# Patient Record
Sex: FEMALE | Race: BLACK OR AFRICAN AMERICAN | NOT HISPANIC OR LATINO | Employment: UNEMPLOYED | URBAN - METROPOLITAN AREA
[De-identification: names, ages, dates, MRNs, and addresses within clinical notes are randomized per-mention and may not be internally consistent; named-entity substitution may affect disease eponyms.]

---

## 2023-02-24 LAB
ANION GAP IN SER/PLAS: 12 MMOL/L (ref 10–20)
BASOPHILS (10*3/UL) IN BLOOD BY AUTOMATED COUNT: 0.02 X10E9/L (ref 0–0.1)
BASOPHILS/100 LEUKOCYTES IN BLOOD BY AUTOMATED COUNT: 0.3 % (ref 0–2)
CALCIUM (MG/DL) IN SER/PLAS: 9 MG/DL (ref 8.6–10.3)
CARBON DIOXIDE, TOTAL (MMOL/L) IN SER/PLAS: 25 MMOL/L (ref 21–32)
CHLORIDE (MMOL/L) IN SER/PLAS: 107 MMOL/L (ref 98–107)
CREATININE (MG/DL) IN SER/PLAS: 0.54 MG/DL (ref 0.5–1.05)
EOSINOPHILS (10*3/UL) IN BLOOD BY AUTOMATED COUNT: 0.43 X10E9/L (ref 0–0.7)
EOSINOPHILS/100 LEUKOCYTES IN BLOOD BY AUTOMATED COUNT: 6.6 % (ref 0–6)
ERYTHROCYTE DISTRIBUTION WIDTH (RATIO) BY AUTOMATED COUNT: 13.2 % (ref 11.5–14.5)
ERYTHROCYTE MEAN CORPUSCULAR HEMOGLOBIN CONCENTRATION (G/DL) BY AUTOMATED: 32.3 G/DL (ref 32–36)
ERYTHROCYTE MEAN CORPUSCULAR VOLUME (FL) BY AUTOMATED COUNT: 96 FL (ref 80–100)
ERYTHROCYTES (10*6/UL) IN BLOOD BY AUTOMATED COUNT: 3.74 X10E12/L (ref 4–5.2)
GFR FEMALE: >90 ML/MIN/1.73M2
GLUCOSE (MG/DL) IN SER/PLAS: 78 MG/DL (ref 74–99)
HEMATOCRIT (%) IN BLOOD BY AUTOMATED COUNT: 35.9 % (ref 36–46)
HEMOGLOBIN (G/DL) IN BLOOD: 11.6 G/DL (ref 12–16)
IMMATURE GRANULOCYTES/100 LEUKOCYTES IN BLOOD BY AUTOMATED COUNT: 0.3 % (ref 0–0.9)
LEUKOCYTES (10*3/UL) IN BLOOD BY AUTOMATED COUNT: 6.5 X10E9/L (ref 4.4–11.3)
LYMPHOCYTES (10*3/UL) IN BLOOD BY AUTOMATED COUNT: 2.11 X10E9/L (ref 1.2–4.8)
LYMPHOCYTES/100 LEUKOCYTES IN BLOOD BY AUTOMATED COUNT: 32.3 % (ref 13–44)
MONOCYTES (10*3/UL) IN BLOOD BY AUTOMATED COUNT: 0.68 X10E9/L (ref 0.1–1)
MONOCYTES/100 LEUKOCYTES IN BLOOD BY AUTOMATED COUNT: 10.4 % (ref 2–10)
NEUTROPHILS (10*3/UL) IN BLOOD BY AUTOMATED COUNT: 3.28 X10E9/L (ref 1.2–7.7)
NEUTROPHILS/100 LEUKOCYTES IN BLOOD BY AUTOMATED COUNT: 50.1 % (ref 40–80)
PLATELETS (10*3/UL) IN BLOOD AUTOMATED COUNT: 327 X10E9/L (ref 150–450)
POTASSIUM (MMOL/L) IN SER/PLAS: 3.6 MMOL/L (ref 3.5–5.3)
SODIUM (MMOL/L) IN SER/PLAS: 140 MMOL/L (ref 136–145)
UREA NITROGEN (MG/DL) IN SER/PLAS: 8 MG/DL (ref 6–23)

## 2023-04-28 LAB
ANION GAP IN SER/PLAS: 12 MMOL/L (ref 10–20)
BASOPHILS (10*3/UL) IN BLOOD BY AUTOMATED COUNT: 0.03 X10E9/L (ref 0–0.1)
BASOPHILS/100 LEUKOCYTES IN BLOOD BY AUTOMATED COUNT: 0.5 % (ref 0–2)
CALCIUM (MG/DL) IN SER/PLAS: 9.9 MG/DL (ref 8.6–10.3)
CARBON DIOXIDE, TOTAL (MMOL/L) IN SER/PLAS: 26 MMOL/L (ref 21–32)
CHLORIDE (MMOL/L) IN SER/PLAS: 103 MMOL/L (ref 98–107)
CREATININE (MG/DL) IN SER/PLAS: 0.81 MG/DL (ref 0.5–1.05)
EOSINOPHILS (10*3/UL) IN BLOOD BY AUTOMATED COUNT: 0.12 X10E9/L (ref 0–0.7)
EOSINOPHILS/100 LEUKOCYTES IN BLOOD BY AUTOMATED COUNT: 2 % (ref 0–6)
ERYTHROCYTE DISTRIBUTION WIDTH (RATIO) BY AUTOMATED COUNT: 14.1 % (ref 11.5–14.5)
ERYTHROCYTE MEAN CORPUSCULAR HEMOGLOBIN CONCENTRATION (G/DL) BY AUTOMATED: 31.1 G/DL (ref 32–36)
ERYTHROCYTE MEAN CORPUSCULAR VOLUME (FL) BY AUTOMATED COUNT: 95 FL (ref 80–100)
ERYTHROCYTES (10*6/UL) IN BLOOD BY AUTOMATED COUNT: 4.08 X10E12/L (ref 4–5.2)
GFR FEMALE: 84 ML/MIN/1.73M2
GLUCOSE (MG/DL) IN SER/PLAS: 82 MG/DL (ref 74–99)
HEMATOCRIT (%) IN BLOOD BY AUTOMATED COUNT: 38.9 % (ref 36–46)
HEMOGLOBIN (G/DL) IN BLOOD: 12.1 G/DL (ref 12–16)
IMMATURE GRANULOCYTES/100 LEUKOCYTES IN BLOOD BY AUTOMATED COUNT: 0.3 % (ref 0–0.9)
LEUKOCYTES (10*3/UL) IN BLOOD BY AUTOMATED COUNT: 6 X10E9/L (ref 4.4–11.3)
LYMPHOCYTES (10*3/UL) IN BLOOD BY AUTOMATED COUNT: 2.1 X10E9/L (ref 1.2–4.8)
LYMPHOCYTES/100 LEUKOCYTES IN BLOOD BY AUTOMATED COUNT: 35.2 % (ref 13–44)
MONOCYTES (10*3/UL) IN BLOOD BY AUTOMATED COUNT: 0.6 X10E9/L (ref 0.1–1)
MONOCYTES/100 LEUKOCYTES IN BLOOD BY AUTOMATED COUNT: 10.1 % (ref 2–10)
NEUTROPHILS (10*3/UL) IN BLOOD BY AUTOMATED COUNT: 3.09 X10E9/L (ref 1.2–7.7)
NEUTROPHILS/100 LEUKOCYTES IN BLOOD BY AUTOMATED COUNT: 51.9 % (ref 40–80)
PLATELETS (10*3/UL) IN BLOOD AUTOMATED COUNT: 310 X10E9/L (ref 150–450)
POTASSIUM (MMOL/L) IN SER/PLAS: 3.8 MMOL/L (ref 3.5–5.3)
SODIUM (MMOL/L) IN SER/PLAS: 137 MMOL/L (ref 136–145)
UREA NITROGEN (MG/DL) IN SER/PLAS: 13 MG/DL (ref 6–23)

## 2023-05-10 ENCOUNTER — HOSPITAL ENCOUNTER (OUTPATIENT)
Dept: DATA CONVERSION | Facility: HOSPITAL | Age: 58
End: 2023-05-10
Attending: OTOLARYNGOLOGY | Admitting: OTOLARYNGOLOGY
Payer: COMMERCIAL

## 2023-05-10 DIAGNOSIS — K12.2 CELLULITIS AND ABSCESS OF MOUTH: ICD-10-CM

## 2023-05-10 DIAGNOSIS — I10 ESSENTIAL (PRIMARY) HYPERTENSION: ICD-10-CM

## 2023-05-10 DIAGNOSIS — F17.200 NICOTINE DEPENDENCE, UNSPECIFIED, UNCOMPLICATED: ICD-10-CM

## 2023-05-10 DIAGNOSIS — K11.5 SIALOLITHIASIS: ICD-10-CM

## 2023-05-10 DIAGNOSIS — E78.5 HYPERLIPIDEMIA, UNSPECIFIED: ICD-10-CM

## 2023-05-30 LAB
COMPLETE PATHOLOGY REPORT: NORMAL
CONVERTED CLINICAL DIAGNOSIS-HISTORY: NORMAL
CONVERTED FINAL DIAGNOSIS: NORMAL
CONVERTED FINAL REPORT PDF LINK TO COPY AND PASTE: NORMAL
CONVERTED GROSS DESCRIPTION: NORMAL

## 2023-06-13 LAB
ALANINE AMINOTRANSFERASE (SGPT) (U/L) IN SER/PLAS: 8 U/L (ref 7–45)
ALBUMIN (G/DL) IN SER/PLAS: 4.2 G/DL (ref 3.4–5)
ALKALINE PHOSPHATASE (U/L) IN SER/PLAS: 56 U/L (ref 33–110)
ANION GAP IN SER/PLAS: 13 MMOL/L (ref 10–20)
APPEARANCE, URINE: NORMAL
ASPARTATE AMINOTRANSFERASE (SGOT) (U/L) IN SER/PLAS: 15 U/L (ref 9–39)
BASOPHILS (10*3/UL) IN BLOOD BY AUTOMATED COUNT: 0.03 X10E9/L (ref 0–0.1)
BASOPHILS/100 LEUKOCYTES IN BLOOD BY AUTOMATED COUNT: 0.4 % (ref 0–2)
BILIRUBIN TOTAL (MG/DL) IN SER/PLAS: 0.3 MG/DL (ref 0–1.2)
BILIRUBIN, URINE: NEGATIVE
BLOOD, URINE: NEGATIVE
C REACTIVE PROTEIN (MG/L) IN SER/PLAS: 0.42 MG/DL
CALCIUM (MG/DL) IN SER/PLAS: 10.3 MG/DL (ref 8.6–10.6)
CARBON DIOXIDE, TOTAL (MMOL/L) IN SER/PLAS: 27 MMOL/L (ref 21–32)
CHLORIDE (MMOL/L) IN SER/PLAS: 106 MMOL/L (ref 98–107)
CITRULLINE ANTIBODY, IGG: <1 U/ML
COLOR, URINE: YELLOW
COMPLEMENT C3 (MG/DL) IN SER/PLAS: 149 MG/DL (ref 87–200)
COMPLEMENT C4 (MG/DL) IN SER/PLAS: 42 MG/DL (ref 10–50)
CREATINE KINASE (U/L) IN SER/PLAS: 50 U/L (ref 0–215)
CREATININE (MG/DL) IN SER/PLAS: 0.72 MG/DL (ref 0.5–1.05)
CREATININE (MG/DL) IN URINE: 120 MG/DL (ref 20–320)
EOSINOPHILS (10*3/UL) IN BLOOD BY AUTOMATED COUNT: 0.15 X10E9/L (ref 0–0.7)
EOSINOPHILS/100 LEUKOCYTES IN BLOOD BY AUTOMATED COUNT: 2.2 % (ref 0–6)
ERYTHROCYTE DISTRIBUTION WIDTH (RATIO) BY AUTOMATED COUNT: 14.2 % (ref 11.5–14.5)
ERYTHROCYTE MEAN CORPUSCULAR HEMOGLOBIN CONCENTRATION (G/DL) BY AUTOMATED: 33 G/DL (ref 32–36)
ERYTHROCYTE MEAN CORPUSCULAR VOLUME (FL) BY AUTOMATED COUNT: 96 FL (ref 80–100)
ERYTHROCYTES (10*6/UL) IN BLOOD BY AUTOMATED COUNT: 3.99 X10E12/L (ref 4–5.2)
GFR FEMALE: >90 ML/MIN/1.73M2
GLUCOSE (MG/DL) IN SER/PLAS: 90 MG/DL (ref 74–99)
GLUCOSE, URINE: NEGATIVE MG/DL
HEMATOCRIT (%) IN BLOOD BY AUTOMATED COUNT: 38.2 % (ref 36–46)
HEMOGLOBIN (G/DL) IN BLOOD: 12.6 G/DL (ref 12–16)
IMMATURE GRANULOCYTES/100 LEUKOCYTES IN BLOOD BY AUTOMATED COUNT: 0.4 % (ref 0–0.9)
KETONES, URINE: NEGATIVE MG/DL
LEUKOCYTE ESTERASE, URINE: NEGATIVE
LEUKOCYTES (10*3/UL) IN BLOOD BY AUTOMATED COUNT: 6.7 X10E9/L (ref 4.4–11.3)
LYMPHOCYTES (10*3/UL) IN BLOOD BY AUTOMATED COUNT: 2.03 X10E9/L (ref 1.2–4.8)
LYMPHOCYTES/100 LEUKOCYTES IN BLOOD BY AUTOMATED COUNT: 30.2 % (ref 13–44)
MONOCYTES (10*3/UL) IN BLOOD BY AUTOMATED COUNT: 0.52 X10E9/L (ref 0.1–1)
MONOCYTES/100 LEUKOCYTES IN BLOOD BY AUTOMATED COUNT: 7.7 % (ref 2–10)
NEUTROPHILS (10*3/UL) IN BLOOD BY AUTOMATED COUNT: 3.96 X10E9/L (ref 1.2–7.7)
NEUTROPHILS/100 LEUKOCYTES IN BLOOD BY AUTOMATED COUNT: 59.1 % (ref 40–80)
NITRITE, URINE: NEGATIVE
NRBC (PER 100 WBCS) BY AUTOMATED COUNT: 0 /100 WBC (ref 0–0)
PH, URINE: 7 (ref 5–8)
PLATELETS (10*3/UL) IN BLOOD AUTOMATED COUNT: 355 X10E9/L (ref 150–450)
POTASSIUM (MMOL/L) IN SER/PLAS: 3.6 MMOL/L (ref 3.5–5.3)
PROTEIN (MG/DL) IN URINE: 15 MG/DL (ref 5–24)
PROTEIN TOTAL: 8 G/DL (ref 6.4–8.2)
PROTEIN, URINE: NEGATIVE MG/DL
PROTEIN/CREATININE (MG/MG) IN URINE: 0.13 MG/MG CREAT (ref 0–0.17)
RBC, URINE: NORMAL /HPF (ref 0–5)
RHEUMATOID FACTOR (IU/ML) IN SERUM OR PLASMA: <10 IU/ML (ref 0–15)
SEDIMENTATION RATE, ERYTHROCYTE: 51 MM/H (ref 0–30)
SODIUM (MMOL/L) IN SER/PLAS: 142 MMOL/L (ref 136–145)
SPECIFIC GRAVITY, URINE: 1.01 (ref 1–1.03)
THYROPEROXIDASE AB (IU/ML) IN SER/PLAS: <28 IU/ML
THYROTROPIN (MIU/L) IN SER/PLAS BY DETECTION LIMIT <= 0.05 MIU/L: 1.23 MIU/L (ref 0.44–3.98)
UREA NITROGEN (MG/DL) IN SER/PLAS: 12 MG/DL (ref 6–23)
UROBILINOGEN, URINE: <2 MG/DL (ref 0–1.9)
WBC, URINE: NORMAL /HPF (ref 0–5)

## 2023-06-14 LAB
ANA PATTERN: ABNORMAL
ANA TITER: ABNORMAL
ANTI-CENTROMERE: <0.2 AI
ANTI-CHROMATIN: 2.5 AI
ANTI-DNA (DS): 5 IU/ML
ANTI-JO-1 IGG: <0.2 AI
ANTI-NUCLEAR ANTIBODY (ANA): POSITIVE
ANTI-RIBOSOMAL P: <0.2 AI
ANTI-RNP: >8 AI
ANTI-SCL-70: <0.2 AI
ANTI-SM/RNP: >8 AI
ANTI-SM: <0.2 AI
ANTI-SSA: 0.3 AI
ANTI-SSB: <0.2 AI

## 2023-06-15 LAB
ALBUMIN ELP: 4 G/DL (ref 3.4–5)
ALPHA 1: 0.3 G/DL (ref 0.2–0.6)
ALPHA 2: 1 G/DL (ref 0.4–1.1)
BETA: 1.1 G/DL (ref 0.5–1.2)
GAMMA GLOBULIN: 1.5 G/DL (ref 0.5–1.4)
PATH REVIEW-SERUM PROTEIN ELECTROPHORESIS: NORMAL
PROTEIN ELECTROPHORESIS INTERPRETATION: ABNORMAL
PROTEIN TOTAL: 8 G/DL (ref 6.4–8.2)

## 2023-06-16 LAB — THYROGLOBULIN AB (IU/ML) IN SER/PLAS: <0.9 IU/ML (ref 0–4)

## 2023-09-07 VITALS
HEART RATE: 80 BPM | DIASTOLIC BLOOD PRESSURE: 81 MMHG | TEMPERATURE: 97.3 F | BODY MASS INDEX: 26.69 KG/M2 | RESPIRATION RATE: 17 BRPM | SYSTOLIC BLOOD PRESSURE: 141 MMHG | WEIGHT: 156.31 LBS | HEIGHT: 64 IN

## 2023-09-14 NOTE — H&P
History of Present Illness:   Pregnant/Lactating:  ·  Are You Pregnant no   ·  Are You Currently Breastfeeding no     History Present Illness:  Reason for surgery: Sialolithiasis of the submandibular  duct and gland   HPI:    HPI: No significant changes to the medical history since last H/P    PMH: reviewed in chart   Fam Hx: negative for bleeding disorders  Social Hx: Reviewed in EMR  Allergies: NKDA  ROS: negative except as above in HPI  Physical Exam:  Gen- NAD  Resp- nonlabored on RA, symmetric chest rise  Head/Face- NCAT, no masses or lesions  Eyes- EOMI, clear sclera  Ears- normal external ears, no gross lesions of EACs  Nose- anterior nares clear, no bleeding or drainage  Mouth- lips without lesions, no excessive drooling  Neuro- alert and interactive    Medications, imaging and pertinent labs reviewed in EMR    A/P  Proceed with planned surgery     Allergies:        Allergies:  ·  penicillin : Other, Rash  ·  Latex : Rash, Itching    Home Medication Review:   Home Medications Reviewed: yes     Impression/Procedure:   ·  Impression and Planned Procedure: Proceed with excision of submandibular gland       ERAS (Enhanced Recovery After Surgery):  ·  ERAS Patient: no       Vital Signs:  Temperature C: 36.3 degrees C   Temperature F: 97.3 degrees F   Heart Rate: 80 beats per minute   Respiratory Rate: 17 breath per minute   Blood Pressure Systolic: 141 mm/Hg   Blood Pressure Diastolic: 81 mm/Hg     Physical Exam by System:    Respiratory/Thorax: Breathing comfortably, no stridor.   Cardiovascular: No clubbing/cyanosis/edema in hands.     Consent:   COVID-19 Consent:  ·  COVID-19 Risk Consent Surgeon has reviewed key risks related to the risk of jacob COVID-19 and if they contract COVID-19 what the risks are.     Attestation:   Note Completion:  I am a:  Resident/Fellow   Attending Attestation I saw and evaluated the patient.  I personally obtained the key and critical portions of the history and  physical exam or was physically present for key and  critical portions performed by the resident/fellow. I reviewed the resident/fellow?s documentation and discussed the patient with the resident/fellow.  I agree with the resident/fellow?s medical decision making as documented in the note.     I personally evaluated the patient on 10-May-2023         Electronic Signatures:  Rashid Alvarado)  (Signed 10-May-2023 07:38)   Authored: Note Completion   Co-Signer: History of Present Illness, Allergies, Home Medication Review, Impression/Procedure, ERAS, Physical Exam, Consent, Note Completion  Anne Rodriguez (Resident))  (Signed 10-May-2023 07:17)   Authored: History of Present Illness, Allergies, Home  Medication Review, Impression/Procedure, ERAS, Physical Exam, Consent, Note Completion      Last Updated: 10-May-2023 07:38 by Rashid Alvarado)

## 2023-09-20 PROBLEM — I10 ESSENTIAL HYPERTENSION: Status: ACTIVE | Noted: 2023-09-20

## 2023-09-20 PROBLEM — J39.0 PARAPHARYNGEAL ABSCESS: Status: ACTIVE | Noted: 2023-09-20

## 2023-09-20 PROBLEM — E87.6 HYPOKALEMIA: Status: ACTIVE | Noted: 2023-09-20

## 2023-09-20 PROBLEM — H52.4 PRESBYOPIA OF BOTH EYES: Status: ACTIVE | Noted: 2023-09-20

## 2023-09-20 PROBLEM — H02.821 CYST OF RIGHT UPPER EYELID: Status: ACTIVE | Noted: 2023-09-20

## 2023-09-20 PROBLEM — R53.81 MALAISE: Status: ACTIVE | Noted: 2023-09-20

## 2023-09-20 PROBLEM — K11.5 SALIVARY DUCT STONE: Status: ACTIVE | Noted: 2023-09-20

## 2023-09-20 PROBLEM — H02.9 EYELID LESION: Status: ACTIVE | Noted: 2023-09-20

## 2023-09-20 PROBLEM — D25.9 UTERINE LEIOMYOMA: Status: ACTIVE | Noted: 2023-09-20

## 2023-09-20 PROBLEM — M79.673 HEEL PAIN: Status: ACTIVE | Noted: 2023-09-20

## 2023-09-20 PROBLEM — R00.0 TACHYCARDIA: Status: ACTIVE | Noted: 2023-09-20

## 2023-09-20 PROBLEM — R79.89 D-DIMER, ELEVATED: Status: ACTIVE | Noted: 2023-09-20

## 2023-09-20 PROBLEM — M35.9 UNDIFFERENTIATED CONNECTIVE TISSUE DISEASE (MULTI): Status: ACTIVE | Noted: 2023-09-20

## 2023-09-20 PROBLEM — L65.9 HAIR LOSS: Status: ACTIVE | Noted: 2023-09-20

## 2023-09-20 PROBLEM — J39.1 PHARYNGEAL ABSCESS: Status: ACTIVE | Noted: 2023-09-20

## 2023-09-20 PROBLEM — N76.0 ACUTE VAGINITIS: Status: ACTIVE | Noted: 2023-09-20

## 2023-09-20 PROBLEM — K11.20 SIALOADENITIS OF SUBMANDIBULAR GLAND: Status: ACTIVE | Noted: 2023-09-20

## 2023-09-20 PROBLEM — A41.9 SEPSIS (MULTI): Status: ACTIVE | Noted: 2023-09-20

## 2023-09-20 PROBLEM — T46.6X5A MYALGIA DUE TO STATIN: Status: ACTIVE | Noted: 2023-09-20

## 2023-09-20 PROBLEM — L40.9 PSORIASIS: Status: ACTIVE | Noted: 2023-09-20

## 2023-09-20 PROBLEM — H02.825 CYST OF LEFT LOWER EYELID: Status: ACTIVE | Noted: 2023-09-20

## 2023-09-20 PROBLEM — K13.70 LESION OF MOUTH: Status: ACTIVE | Noted: 2023-09-20

## 2023-09-20 PROBLEM — M25.511 RIGHT SHOULDER PAIN: Status: ACTIVE | Noted: 2023-09-20

## 2023-09-20 PROBLEM — L66.9 CICATRICIAL ALOPECIA, UNSPECIFIED: Status: ACTIVE | Noted: 2021-09-14

## 2023-09-20 PROBLEM — K12.2 SUBMANDIBULAR ABSCESS: Status: ACTIVE | Noted: 2023-09-20

## 2023-09-20 PROBLEM — R53.83 FATIGUE: Status: ACTIVE | Noted: 2023-09-20

## 2023-09-20 PROBLEM — R59.0 CERVICAL LYMPHADENOPATHY: Status: ACTIVE | Noted: 2023-09-20

## 2023-09-20 PROBLEM — G89.29 BACK PAIN, CHRONIC: Status: ACTIVE | Noted: 2023-09-20

## 2023-09-20 PROBLEM — K64.8 INTERNAL HEMORRHOIDS: Status: ACTIVE | Noted: 2023-09-20

## 2023-09-20 PROBLEM — H91.90 HEARING DIFFICULTY: Status: ACTIVE | Noted: 2023-09-20

## 2023-09-20 PROBLEM — M54.2 NECK PAIN: Status: ACTIVE | Noted: 2023-09-20

## 2023-09-20 PROBLEM — D10.39 PAPILLOMA OF UVULA: Status: ACTIVE | Noted: 2023-09-20

## 2023-09-20 PROBLEM — N90.89 VULVAR LESION: Status: ACTIVE | Noted: 2023-09-20

## 2023-09-20 PROBLEM — M15.9 OSTEOARTHRITIS, MULTIPLE SITES: Status: ACTIVE | Noted: 2023-09-20

## 2023-09-20 PROBLEM — J06.9 ACUTE UPPER RESPIRATORY INFECTION: Status: ACTIVE | Noted: 2023-09-20

## 2023-09-20 PROBLEM — M72.2 PLANTAR FASCIITIS: Status: ACTIVE | Noted: 2023-09-20

## 2023-09-20 PROBLEM — K21.9 GERD (GASTROESOPHAGEAL REFLUX DISEASE): Status: ACTIVE | Noted: 2023-09-20

## 2023-09-20 PROBLEM — J02.9 SORE THROAT: Status: ACTIVE | Noted: 2023-09-20

## 2023-09-20 PROBLEM — N93.8 UTERINE BLEEDING, DYSFUNCTIONAL: Status: ACTIVE | Noted: 2023-09-20

## 2023-09-20 PROBLEM — L70.9 ACNE: Status: ACTIVE | Noted: 2021-09-14

## 2023-09-20 PROBLEM — K14.6 TONGUE BURNING SENSATION: Status: ACTIVE | Noted: 2023-09-20

## 2023-09-20 PROBLEM — B07.9 CUTANEOUS WART: Status: ACTIVE | Noted: 2023-09-20

## 2023-09-20 PROBLEM — G89.18 ACUTE POST-OPERATIVE PAIN: Status: ACTIVE | Noted: 2023-09-20

## 2023-09-20 PROBLEM — R21 RASH: Status: ACTIVE | Noted: 2023-09-20

## 2023-09-20 PROBLEM — E78.5 HYPERLIPIDEMIA: Status: ACTIVE | Noted: 2023-09-20

## 2023-09-20 PROBLEM — N92.0 MENORRHAGIA: Status: ACTIVE | Noted: 2023-09-20

## 2023-09-20 PROBLEM — K11.20 SIALADENITIS: Status: ACTIVE | Noted: 2023-09-20

## 2023-09-20 PROBLEM — B37.31 VAGINAL CANDIDIASIS: Status: ACTIVE | Noted: 2023-09-20

## 2023-09-20 PROBLEM — L93.0 DISCOID LUPUS ERYTHEMATOSUS: Status: ACTIVE | Noted: 2021-09-14

## 2023-09-20 PROBLEM — M54.9 BACK PAIN, CHRONIC: Status: ACTIVE | Noted: 2023-09-20

## 2023-09-20 PROBLEM — H10.9 CONJUNCTIVITIS: Status: ACTIVE | Noted: 2023-09-20

## 2023-09-20 PROBLEM — K12.0 CANKER SORE: Status: ACTIVE | Noted: 2023-09-20

## 2023-09-20 PROBLEM — M79.10 MYALGIA DUE TO STATIN: Status: ACTIVE | Noted: 2023-09-20

## 2023-09-20 RX ORDER — CEFTRIAXONE 2 G/1
INJECTION, POWDER, FOR SOLUTION INTRAMUSCULAR; INTRAVENOUS
COMMUNITY
Start: 2023-01-26 | End: 2023-12-22 | Stop reason: WASHOUT

## 2023-09-20 RX ORDER — TRIAMCINOLONE ACETONIDE 1 MG/G
OINTMENT TOPICAL
COMMUNITY
Start: 2022-05-11 | End: 2023-12-22 | Stop reason: WASHOUT

## 2023-09-20 RX ORDER — ROSUVASTATIN CALCIUM 5 MG/1
1 TABLET, COATED ORAL DAILY
COMMUNITY
Start: 2015-09-10 | End: 2023-11-22 | Stop reason: WASHOUT

## 2023-09-20 RX ORDER — ASCORBIC ACID 250 MG
1 TABLET,CHEWABLE ORAL DAILY
COMMUNITY

## 2023-09-20 RX ORDER — EPINEPHRINE 0.22MG
AEROSOL WITH ADAPTER (ML) INHALATION
COMMUNITY
Start: 2022-03-02

## 2023-09-20 RX ORDER — TRAMADOL HYDROCHLORIDE 50 MG/1
TABLET ORAL
COMMUNITY
Start: 2023-01-17 | End: 2023-12-22 | Stop reason: WASHOUT

## 2023-09-20 RX ORDER — HYDROCORTISONE 25 MG/G
OINTMENT TOPICAL
COMMUNITY
Start: 2019-01-30 | End: 2023-12-22 | Stop reason: WASHOUT

## 2023-09-20 RX ORDER — CLINDAMYCIN HYDROCHLORIDE 300 MG/1
CAPSULE ORAL
COMMUNITY
Start: 2023-01-17 | End: 2023-12-22 | Stop reason: WASHOUT

## 2023-09-20 RX ORDER — PREDNISONE 20 MG/1
40 TABLET ORAL DAILY
COMMUNITY
Start: 2023-01-09 | End: 2023-12-22 | Stop reason: WASHOUT

## 2023-09-20 RX ORDER — CEPHALEXIN 500 MG/1
500 TABLET ORAL 3 TIMES DAILY
COMMUNITY
Start: 2023-02-07 | End: 2023-12-22 | Stop reason: WASHOUT

## 2023-09-20 RX ORDER — EZETIMIBE 10 MG/1
1 TABLET ORAL DAILY
COMMUNITY
Start: 2021-05-21

## 2023-09-20 RX ORDER — HYDROCHLOROTHIAZIDE 25 MG/1
25 TABLET ORAL DAILY
COMMUNITY
End: 2024-03-26 | Stop reason: SDUPTHER

## 2023-09-20 RX ORDER — CYCLOBENZAPRINE HCL 10 MG
TABLET ORAL 3 TIMES DAILY PRN
COMMUNITY
Start: 2019-08-23 | End: 2024-05-28 | Stop reason: WASHOUT

## 2023-09-20 RX ORDER — CALCIUM CARBONATE/VITAMIN D3 600 MG-10
TABLET ORAL
COMMUNITY

## 2023-09-20 RX ORDER — SODIUM FLUORIDE 5 MG/G
GEL, DENTIFRICE DENTAL
COMMUNITY
Start: 2020-12-14 | End: 2024-05-28 | Stop reason: WASHOUT

## 2023-09-20 RX ORDER — FLUCONAZOLE 150 MG
TABLET ORAL
COMMUNITY
Start: 2022-09-14 | End: 2023-11-22 | Stop reason: WASHOUT

## 2023-09-20 RX ORDER — OXYCODONE HYDROCHLORIDE 5 MG/1
5 TABLET ORAL EVERY 6 HOURS
COMMUNITY
Start: 2023-01-17 | End: 2023-01-20

## 2023-09-20 RX ORDER — AMLODIPINE BESYLATE 5 MG/1
1 TABLET ORAL DAILY
COMMUNITY
Start: 2020-10-14 | End: 2023-11-21 | Stop reason: ALTCHOICE

## 2023-09-20 RX ORDER — KETOCONAZOLE 20 MG/G
CREAM TOPICAL
COMMUNITY
Start: 2015-06-04 | End: 2023-12-22 | Stop reason: WASHOUT

## 2023-09-20 RX ORDER — CHOLECALCIFEROL (VITAMIN D3) 25 MCG
1 TABLET ORAL DAILY
COMMUNITY

## 2023-09-20 RX ORDER — HYDROXYCHLOROQUINE SULFATE 200 MG/1
TABLET, FILM COATED ORAL
COMMUNITY
Start: 2016-10-26 | End: 2023-12-22 | Stop reason: WASHOUT

## 2023-09-20 RX ORDER — POTASSIUM CHLORIDE 1500 MG/1
20 TABLET, EXTENDED RELEASE ORAL DAILY
COMMUNITY
Start: 2023-02-17 | End: 2023-12-22 | Stop reason: WASHOUT

## 2023-09-20 RX ORDER — TACROLIMUS 1 MG/G
OINTMENT TOPICAL
COMMUNITY
Start: 2021-05-11 | End: 2023-12-22 | Stop reason: WASHOUT

## 2023-09-20 RX ORDER — ONDANSETRON 4 MG/1
TABLET, ORALLY DISINTEGRATING ORAL
COMMUNITY
Start: 2023-04-06 | End: 2023-11-22 | Stop reason: WASHOUT

## 2023-09-20 RX ORDER — DOXYCYCLINE 100 MG/1
CAPSULE ORAL EVERY 12 HOURS
COMMUNITY
Start: 2022-09-14 | End: 2023-12-22 | Stop reason: WASHOUT

## 2023-09-20 RX ORDER — MELOXICAM 7.5 MG/1
1 TABLET ORAL 2 TIMES DAILY PRN
COMMUNITY
Start: 2022-09-15 | End: 2023-12-22 | Stop reason: WASHOUT

## 2023-10-02 NOTE — OP NOTE
PROCEDURE DETAILS    Preoperative Diagnosis:  Sialolithiasis, K11.5  Cellulitis and abscess of mouth, K12.2    Postoperative Diagnosis:  Sialolithiasis, K11.5  Cellulitis and abscess of mouth, K12.2    Surgeon: Rashid Alvarado  Resident/Fellow/Other Assistant: Luis Eduardo Ramires    Procedure:  1. Left Submandibular Gland Excision  2. Intraoral Salivary Stone Removal  3. Sialodochoplasty    Anesthesia: Declan, Jaquan  Estimated Blood Loss: 75  Findings: 1. Stone of left submandibular duct  Specimens(s) Collected: yes,  Left submandibular gland   Complications: None  Patient Returned To/Condition: PACU/Stable                                 Attestation:   Note Completion:  Attending Attestation I was present for the entire procedure    I am a: Resident/Fellow         Electronic Signatures:  Rashid Alvarado)  (Signed 10-May-2023 13:25)   Authored: Note Completion   Co-Signer: Post-Operative Note, Chart Review, Note Completion  Anne Rodriguez (Resident))  (Signed 10-May-2023 12:12)   Authored: Post-Operative Note, Chart Review, Note Completion      Last Updated: 10-May-2023 13:25 by Rashid Alvarado)

## 2023-10-24 ENCOUNTER — APPOINTMENT (OUTPATIENT)
Dept: RHEUMATOLOGY | Facility: CLINIC | Age: 58
End: 2023-10-24
Payer: COMMERCIAL

## 2023-11-20 ENCOUNTER — TELEPHONE (OUTPATIENT)
Dept: PRIMARY CARE | Facility: CLINIC | Age: 58
End: 2023-11-20
Payer: COMMERCIAL

## 2023-11-21 ENCOUNTER — TELEMEDICINE (OUTPATIENT)
Dept: PRIMARY CARE | Facility: CLINIC | Age: 58
End: 2023-11-21
Payer: COMMERCIAL

## 2023-11-21 DIAGNOSIS — K13.70 LESION OF MOUTH: Primary | ICD-10-CM

## 2023-11-21 PROCEDURE — 99213 OFFICE O/P EST LOW 20 MIN: CPT | Performed by: FAMILY MEDICINE

## 2023-11-21 RX ORDER — AMLODIPINE BESYLATE 5 MG/1
TABLET ORAL
COMMUNITY
Start: 2023-01-18 | End: 2023-12-22 | Stop reason: WASHOUT

## 2023-11-21 RX ORDER — LIDOCAINE HYDROCHLORIDE 20 MG/ML
1.25 SOLUTION OROPHARYNGEAL AS NEEDED
Qty: 100 ML | Refills: 0 | Status: SHIPPED | OUTPATIENT
Start: 2023-11-21 | End: 2023-12-01

## 2023-11-21 NOTE — PROGRESS NOTES
Chief Complaint:   No chief complaint on file.     History Of Present Illness:    Prema Castañeda is a 58 y.o. female presenting with a history of HTN, hyperlipidemia and connective tissue disorder and internal hemorrhoids, Jan 2023- surgery for submandibular/parapharyngeal abscesses, who is presenting for a sore throat.     Salivary glands and face swelling- Jan 2023.   Jan 2023- surgeries for left submandibular and parapharyngeal abscess. sent home with PICC line for antibiotics.   Feb 15, 2023 update: Was having some symptoms with antibiotics and just went back on antibiotics last week. Muscles hurting, potassium levels were off.   March 8, 2023- will have salivary gland stone removal surgery planned.   Nov 2023 update- some taste changes with spicy foods, used numbing mouth wash that is helpful.      Feb 22, 2022- Right sided chest pain- moved to her right shoulder blade. started around 315pm and lasted all throughout the night. Used heating pad and nebulizer. Sleeping upright was more helpful. Now it is a dull pain. Not coughing as much today. Exertion does not effect chest pain.   Some chronic left jaw numbness.   Sept 2022 update: none recently.      Feb 16, 2022- Chest congestion and cough. Since last Wed. Wanted to make sure lungs and heart were ok. Granddaughter came home from school Tue with a cough. Then the next day she started getting sick. Cough: all throughout the day, not horrible. Coughing up phlegm and mucus. Some in chest. Was taking nyquil and vitamins. No concerns for COVID.   Sept 2022 update: good now.   Nov 2023 update: had a cold about a month ago, but she's been coughing since then. Sometimes productive, sometimes dry. We discussed saline rinses.      HTN- she's been taking her hctz. Pt has declined adding medications previously. Her blood pressure has been elevated, and she did not take her medications today. Nov 2023 update: agreed to continue on amlodipine 2.5mg.      Cholesterol-  she's not taking her medication. Some more body aches. Feb 2022 update: not taking crestor now. she is taking zetia. Feb 24, 2022- restarted crestor. Nov 2023 update: off and on with her taking crestor. We agreed she could at least try it every other day.         Rheum-she will not have a rheumatology visit until January 2021. She states that she is having shoulder pain, which was not improved after a January 2020 subacromial bursa injection here in the office. She is also having vague right elbow pain and other aches and pains that may be related to her mixed connective tissue disorder. May 13, 2021 update: was having pain recently and she went to [facility] for rehab. put back on hydroxychloroquine. Feb 2022 update- back on hydroxychloroquine. March 2022- last visit, still on hydroxychloroquine. Feb 2023 update: still taking hydroxychloroquine.         SH: rare cig smoking. stopped etoh. no idu.      Flu shot: declines flu shot     Mammogram: Oct 2020.      COVID: never tested positive. unlikely to take vaccine.    .       Review of Systems:    Negative  Constitutional: fever, chills, night sweats, unexpected weight change, changes in sleep  Eyes: loss of vision, double vision, floaters  Ear/Nose/Throat/Mouth: sore throat, hearing changes, sinus congestion  Cardiovascular: chest pain, chest heaviness, palpitations, swelling in ankles  Psychological: feelings of depression, feelings of anxiety.  Endocrine: excessive thirst, feeling too hot, feeling too cold, feeling tired, fatigue  Hematologic/Lymphatic: swollen glands, blood clotting problems, easy bruising.   Respiratory: shortness of breath, difficulty breathing, wheezing  Neurological: loss of consciousness, tremors, dizzy spells, numbness, tingling.  Gastrointestinal: abdominal pain, nausea, vomiting, indigestion, heartburn, sour taste in throat when waking up, constipation, diarrhea, bloody stools, loss of bowel control  Genitourinary: urinary incontinence,  increased urinary frequency, painful urination, blood in urine  Skin: Rash, lumps or bumps, worrisome moles  Musculoskeletal: bone pain, muscle pain, joint pain  Sexual: sexual health concerns      Positive  Psychological: feeling generally happy, feeling safe at home      Last Recorded Vitals:  There were no vitals filed for this visit.    Past Medical History:  She has a past medical history of Anesthesia of skin, Anxiety disorder, unspecified, Localized enlarged lymph nodes (01/17/2019), Myalgia, unspecified site, Other conditions influencing health status (03/31/2015), Personal history of other diseases of the circulatory system, Personal history of other mental and behavioral disorders, Personal history of other specified conditions, and Pure hypercholesterolemia, unspecified.    Past Surgical History:  She has a past surgical history that includes Tubal ligation (01/10/2014) and Hysterectomy (01/10/2014).      Social History:  She has no history on file for tobacco use, alcohol use, and drug use.    Family History:  No family history on file.     Allergies:  Latex and Penicillins    Outpatient Medications:  Current Outpatient Medications   Medication Instructions    amLODIPine (Norvasc) 5 mg tablet 1 tablet, oral, Daily    ascorbic acid (Vitamin C) 250 MG chewable tablet 1 tablet, oral, Daily    BACILLUS COAGULANS-INULIN ORAL oral    benzoyl peroxide 5 % lotion 1 application    cefTRIAXone (Rocephin) 2 gram     cephalexin (KEFTAB) 500 mg, oral, 3 times daily    cholecalciferol (Vitamin D-3) 25 MCG (1000 UT) tablet 1 tablet, oral, Daily    clindamycin (Cleocin) 300 mg capsule     coenzyme Q-10 100 mg capsule oral    cyclobenzaprine (Flexeril) 10 mg tablet oral, 3 times daily PRN    diclofenac sodium 1 % kit transdermal    Diflucan 150 mg tablet oral, Daily RT    doxycycline (Vibramycin) 100 mg capsule oral, Every 12 hours    ezetimibe (Zetia) 10 mg tablet 1 tablet, oral, Daily    fluoride, sodium, (DentaGeL)  1.1 % gel dental    hydroCHLOROthiazide (HYDRODIURIL) 25 mg, oral, Daily, as directed    hydrocortisone 2.5 % ointment 1 Application    hydroxychloroquine (Plaquenil) 200 mg tablet oral    ketoconazole (NIZOral) 2 % cream 1 application    L. acidophilus/Bifid. animalis 15.5 billion cell capsule oral    meloxicam (Mobic) 7.5 mg tablet 1 tablet, oral, 2 times daily PRN    omega-3 fatty acids-fish oil (One-Per-Day Omega-3) 684-1,200 mg capsule oral    ondansetron ODT (Zofran-ODT) 4 mg disintegrating tablet TAKE 1 TABLET EVERY 6HRS AS NEEDED FOR NAUSEA    potassium chloride CR 20 mEq ER tablet 20 mEq, oral, Daily    predniSONE (DELTASONE) 40 mg, oral, Daily    rosuvastatin (Crestor) 5 mg tablet 1 tablet, oral, Daily    tacrolimus (Protopic) 0.1 % ointment 1 Application    traMADol (Ultram) 50 mg tablet     triamcinolone (Kenalog) 0.1 % ointment APPLY AND GENTLY MASSAGE INTO AFFECTED AREA on left knee twice daily until the areas is flat/smooth       Physical Exam:  GENERAL: Well developed, well nourished, alert and cooperative, and appears to be in no acute distress.  PSYCH: mood pleasant and appropriate  HEAD: normocephalic       Last Labs:  CBC -  Lab Results   Component Value Date    WBC 6.7 06/13/2023    HGB 12.6 06/13/2023    HCT 38.2 06/13/2023    MCV 96 06/13/2023     06/13/2023       CMP -  Lab Results   Component Value Date    CALCIUM 10.3 06/13/2023    PHOS 3.5 01/17/2023    PROT 8.0 06/13/2023    PROT 8.0 06/13/2023    ALBUMIN 4.2 06/13/2023    AST 15 06/13/2023    ALT 8 06/13/2023    ALKPHOS 56 06/13/2023    BILITOT 0.3 06/13/2023       LIPID PANEL -   Lab Results   Component Value Date    CHOL 242 (H) 02/15/2023    TRIG 135 02/15/2023    HDL 54.3 02/15/2023    CHHDL 4.5 02/15/2023    LDLF 161 (H) 02/15/2023    VLDL 27 02/15/2023         Lab Results   Component Value Date    HGBA1C 5.4 10/14/2020       Assessment/Plan   Problem List Items Addressed This Visit             ICD-10-CM    Lesion of mouth -  Primary K13.70     Virtual visit.  Continue amlodipine 2.5 mg for your blood pressure.    Take Crestor as often as you can for your high cholesterol.  Lidocaine mouthwash sent to the pharmacy, let the office know if I need to redo the prescription.    Follow-up virtually or in person in 4 to 6 months    Carmelo Lopez, DO

## 2023-11-22 ENCOUNTER — OFFICE VISIT (OUTPATIENT)
Dept: PRIMARY CARE | Facility: CLINIC | Age: 58
End: 2023-11-22
Payer: COMMERCIAL

## 2023-11-22 ENCOUNTER — APPOINTMENT (OUTPATIENT)
Dept: RADIOLOGY | Facility: HOSPITAL | Age: 58
End: 2023-11-22
Payer: COMMERCIAL

## 2023-11-22 ENCOUNTER — HOSPITAL ENCOUNTER (EMERGENCY)
Facility: HOSPITAL | Age: 58
Discharge: HOME | End: 2023-11-22
Attending: GENERAL PRACTICE
Payer: COMMERCIAL

## 2023-11-22 VITALS
HEIGHT: 61 IN | HEART RATE: 94 BPM | RESPIRATION RATE: 20 BRPM | SYSTOLIC BLOOD PRESSURE: 131 MMHG | WEIGHT: 155 LBS | DIASTOLIC BLOOD PRESSURE: 89 MMHG | TEMPERATURE: 98.1 F | BODY MASS INDEX: 29.27 KG/M2 | OXYGEN SATURATION: 96 %

## 2023-11-22 VITALS
HEART RATE: 109 BPM | DIASTOLIC BLOOD PRESSURE: 116 MMHG | WEIGHT: 155 LBS | BODY MASS INDEX: 29.27 KG/M2 | HEIGHT: 61 IN | SYSTOLIC BLOOD PRESSURE: 169 MMHG

## 2023-11-22 DIAGNOSIS — J40 BRONCHITIS: Primary | ICD-10-CM

## 2023-11-22 DIAGNOSIS — R07.89 CHEST PRESSURE: Primary | ICD-10-CM

## 2023-11-22 LAB
ALBUMIN SERPL BCP-MCNC: 4.2 G/DL (ref 3.4–5)
ALP SERPL-CCNC: 59 U/L (ref 33–110)
ALT SERPL W P-5'-P-CCNC: 13 U/L (ref 7–45)
ANION GAP SERPL CALC-SCNC: 13 MMOL/L (ref 10–20)
AST SERPL W P-5'-P-CCNC: 20 U/L (ref 9–39)
BASOPHILS # BLD AUTO: 0.04 X10*3/UL (ref 0–0.1)
BASOPHILS NFR BLD AUTO: 0.5 %
BILIRUB SERPL-MCNC: 0.4 MG/DL (ref 0–1.2)
BNP SERPL-MCNC: 12 PG/ML (ref 0–99)
BUN SERPL-MCNC: 12 MG/DL (ref 6–23)
CALCIUM SERPL-MCNC: 10.1 MG/DL (ref 8.6–10.3)
CARDIAC TROPONIN I PNL SERPL HS: 4 NG/L (ref 0–13)
CHLORIDE SERPL-SCNC: 103 MMOL/L (ref 98–107)
CO2 SERPL-SCNC: 25 MMOL/L (ref 21–32)
CREAT SERPL-MCNC: 0.66 MG/DL (ref 0.5–1.05)
D DIMER PPP FEU-MCNC: 373 NG/ML FEU
EOSINOPHIL # BLD AUTO: 0.39 X10*3/UL (ref 0–0.7)
EOSINOPHIL NFR BLD AUTO: 4.9 %
ERYTHROCYTE [DISTWIDTH] IN BLOOD BY AUTOMATED COUNT: 13.2 % (ref 11.5–14.5)
FLUAV RNA RESP QL NAA+PROBE: NOT DETECTED
FLUBV RNA RESP QL NAA+PROBE: NOT DETECTED
GFR SERPL CREATININE-BSD FRML MDRD: >90 ML/MIN/1.73M*2
GLUCOSE SERPL-MCNC: 91 MG/DL (ref 74–99)
HCT VFR BLD AUTO: 39.2 % (ref 36–46)
HGB BLD-MCNC: 13.3 G/DL (ref 12–16)
IMM GRANULOCYTES # BLD AUTO: 0.03 X10*3/UL (ref 0–0.7)
IMM GRANULOCYTES NFR BLD AUTO: 0.4 % (ref 0–0.9)
LYMPHOCYTES # BLD AUTO: 2.14 X10*3/UL (ref 1.2–4.8)
LYMPHOCYTES NFR BLD AUTO: 26.9 %
MCH RBC QN AUTO: 32.8 PG (ref 26–34)
MCHC RBC AUTO-ENTMCNC: 33.9 G/DL (ref 32–36)
MCV RBC AUTO: 97 FL (ref 80–100)
MONOCYTES # BLD AUTO: 0.59 X10*3/UL (ref 0.1–1)
MONOCYTES NFR BLD AUTO: 7.4 %
NEUTROPHILS # BLD AUTO: 4.77 X10*3/UL (ref 1.2–7.7)
NEUTROPHILS NFR BLD AUTO: 59.9 %
NRBC BLD-RTO: 0 /100 WBCS (ref 0–0)
PLATELET # BLD AUTO: 304 X10*3/UL (ref 150–450)
POTASSIUM SERPL-SCNC: 3.3 MMOL/L (ref 3.5–5.3)
PROT SERPL-MCNC: 8.3 G/DL (ref 6.4–8.2)
RBC # BLD AUTO: 4.06 X10*6/UL (ref 4–5.2)
RSV RNA RESP QL NAA+PROBE: DETECTED
SARS-COV-2 RNA RESP QL NAA+PROBE: NOT DETECTED
SODIUM SERPL-SCNC: 138 MMOL/L (ref 136–145)
WBC # BLD AUTO: 8 X10*3/UL (ref 4.4–11.3)

## 2023-11-22 PROCEDURE — 85025 COMPLETE CBC W/AUTO DIFF WBC: CPT | Performed by: GENERAL PRACTICE

## 2023-11-22 PROCEDURE — 80053 COMPREHEN METABOLIC PANEL: CPT | Performed by: GENERAL PRACTICE

## 2023-11-22 PROCEDURE — 87636 SARSCOV2 & INF A&B AMP PRB: CPT | Performed by: GENERAL PRACTICE

## 2023-11-22 PROCEDURE — 2500000001 HC RX 250 WO HCPCS SELF ADMINISTERED DRUGS (ALT 637 FOR MEDICARE OP)

## 2023-11-22 PROCEDURE — 99214 OFFICE O/P EST MOD 30 MIN: CPT | Performed by: FAMILY MEDICINE

## 2023-11-22 PROCEDURE — 85379 FIBRIN DEGRADATION QUANT: CPT | Performed by: GENERAL PRACTICE

## 2023-11-22 PROCEDURE — 99283 EMERGENCY DEPT VISIT LOW MDM: CPT | Mod: 25

## 2023-11-22 PROCEDURE — 71046 X-RAY EXAM CHEST 2 VIEWS: CPT

## 2023-11-22 PROCEDURE — 2500000002 HC RX 250 W HCPCS SELF ADMINISTERED DRUGS (ALT 637 FOR MEDICARE OP, ALT 636 FOR OP/ED): Performed by: GENERAL PRACTICE

## 2023-11-22 PROCEDURE — 71046 X-RAY EXAM CHEST 2 VIEWS: CPT | Performed by: RADIOLOGY

## 2023-11-22 PROCEDURE — 99285 EMERGENCY DEPT VISIT HI MDM: CPT | Mod: 25 | Performed by: GENERAL PRACTICE

## 2023-11-22 PROCEDURE — 83880 ASSAY OF NATRIURETIC PEPTIDE: CPT | Performed by: GENERAL PRACTICE

## 2023-11-22 PROCEDURE — 36415 COLL VENOUS BLD VENIPUNCTURE: CPT | Performed by: STUDENT IN AN ORGANIZED HEALTH CARE EDUCATION/TRAINING PROGRAM

## 2023-11-22 PROCEDURE — 84484 ASSAY OF TROPONIN QUANT: CPT | Performed by: GENERAL PRACTICE

## 2023-11-22 PROCEDURE — 94640 AIRWAY INHALATION TREATMENT: CPT

## 2023-11-22 PROCEDURE — 3077F SYST BP >= 140 MM HG: CPT | Performed by: FAMILY MEDICINE

## 2023-11-22 PROCEDURE — 85025 COMPLETE CBC W/AUTO DIFF WBC: CPT | Performed by: STUDENT IN AN ORGANIZED HEALTH CARE EDUCATION/TRAINING PROGRAM

## 2023-11-22 PROCEDURE — 36415 COLL VENOUS BLD VENIPUNCTURE: CPT | Performed by: GENERAL PRACTICE

## 2023-11-22 PROCEDURE — 3080F DIAST BP >= 90 MM HG: CPT | Performed by: FAMILY MEDICINE

## 2023-11-22 RX ORDER — POTASSIUM CHLORIDE 1.5 G/1.58G
20 POWDER, FOR SOLUTION ORAL 2 TIMES DAILY
Status: DISCONTINUED | OUTPATIENT
Start: 2023-11-22 | End: 2023-11-22 | Stop reason: HOSPADM

## 2023-11-22 RX ORDER — ROSUVASTATIN CALCIUM 5 MG/1
TABLET, COATED ORAL
COMMUNITY
Start: 2023-01-25 | End: 2024-05-14 | Stop reason: SDUPTHER

## 2023-11-22 RX ORDER — IPRATROPIUM BROMIDE AND ALBUTEROL SULFATE 2.5; .5 MG/3ML; MG/3ML
3 SOLUTION RESPIRATORY (INHALATION) ONCE
Status: COMPLETED | OUTPATIENT
Start: 2023-11-22 | End: 2023-11-22

## 2023-11-22 RX ORDER — AZITHROMYCIN 250 MG/1
TABLET, FILM COATED ORAL
Qty: 6 TABLET | Refills: 0 | Status: SHIPPED | OUTPATIENT
Start: 2023-11-22 | End: 2023-11-27

## 2023-11-22 RX ORDER — ALBUTEROL SULFATE 90 UG/1
2 AEROSOL, METERED RESPIRATORY (INHALATION) EVERY 4 HOURS PRN
Qty: 18 G | Refills: 0 | Status: SHIPPED | OUTPATIENT
Start: 2023-11-22 | End: 2023-12-22

## 2023-11-22 RX ORDER — BENZONATATE 100 MG/1
200 CAPSULE ORAL 3 TIMES DAILY PRN
Qty: 21 CAPSULE | Refills: 0 | Status: SHIPPED | OUTPATIENT
Start: 2023-11-22 | End: 2023-11-29

## 2023-11-22 RX ORDER — METHYLPREDNISOLONE 4 MG/1
TABLET ORAL
Qty: 21 TABLET | Refills: 0 | Status: SHIPPED | OUTPATIENT
Start: 2023-11-22 | End: 2023-11-29

## 2023-11-22 RX ORDER — POTASSIUM CHLORIDE 1.5 G/1.58G
POWDER, FOR SOLUTION ORAL
Status: COMPLETED
Start: 2023-11-22 | End: 2023-11-22

## 2023-11-22 RX ADMIN — POTASSIUM CHLORIDE 20 MEQ: 1.5 POWDER, FOR SOLUTION ORAL at 18:32

## 2023-11-22 RX ADMIN — IPRATROPIUM BROMIDE AND ALBUTEROL SULFATE 3 ML: 2.5; .5 SOLUTION RESPIRATORY (INHALATION) at 17:49

## 2023-11-22 ASSESSMENT — COLUMBIA-SUICIDE SEVERITY RATING SCALE - C-SSRS
2. HAVE YOU ACTUALLY HAD ANY THOUGHTS OF KILLING YOURSELF?: NO
6. HAVE YOU EVER DONE ANYTHING, STARTED TO DO ANYTHING, OR PREPARED TO DO ANYTHING TO END YOUR LIFE?: NO
1. IN THE PAST MONTH, HAVE YOU WISHED YOU WERE DEAD OR WISHED YOU COULD GO TO SLEEP AND NOT WAKE UP?: NO

## 2023-11-22 ASSESSMENT — PAIN - FUNCTIONAL ASSESSMENT: PAIN_FUNCTIONAL_ASSESSMENT: 0-10

## 2023-11-22 ASSESSMENT — PAIN SCALES - GENERAL
PAINLEVEL_OUTOF10: 0 - NO PAIN
PAINLEVEL_OUTOF10: 0 - NO PAIN

## 2023-11-22 ASSESSMENT — PAIN DESCRIPTION - DESCRIPTORS: DESCRIPTORS: ACHING

## 2023-11-22 NOTE — PROGRESS NOTES
Chief Complaint:   No chief complaint on file.     History Of Present Illness:    Prema Castañeda is a 58 y.o. female presenting with a history of HTN, hyperlipidemia and connective tissue disorder and internal hemorrhoids, Jan 2023- surgery for submandibular/parapharyngeal abscesses, who is presenting for a sick visit    For the past week she has had midsternal chest pain and pressure.  She states that it is so painful that she is having a hard time sleeping.  Its been fairly constant.  She believes that she had an illness that she did pass to her daughter.  We did have a virtual visit recently but she is here today in person.    We had a discussion that although this could be an upper respiratory tract infection, with her blood pressure age and high cholesterol that she should seek evaluation in the emergency department for potential acute coronary syndrome.       Salivary glands and face swelling- Jan 2023.   Jan 2023- surgeries for left submandibular and parapharyngeal abscess. sent home with PICC line for antibiotics.   Feb 15, 2023 update: Was having some symptoms with antibiotics and just went back on antibiotics last week. Muscles hurting, potassium levels were off.   March 8, 2023- will have salivary gland stone removal surgery planned.   Nov 2023 update- some taste changes with spicy foods, used numbing mouth wash that is helpful.      Feb 22, 2022- Right sided chest pain- moved to her right shoulder blade. started around 315pm and lasted all throughout the night. Used heating pad and nebulizer. Sleeping upright was more helpful. Now it is a dull pain. Not coughing as much today. Exertion does not effect chest pain.   Some chronic left jaw numbness.   Sept 2022 update: none recently.      Feb 16, 2022- Chest congestion and cough. Since last Wed. Wanted to make sure lungs and heart were ok. Granddaughter came home from school Tue with a cough. Then the next day she started getting sick. Cough: all  throughout the day, not horrible. Coughing up phlegm and mucus. Some in chest. Was taking nyquil and vitamins. No concerns for COVID.   Sept 2022 update: good now.   Nov 2023 update: had a cold about a month ago, but she's been coughing since then. Sometimes productive, sometimes dry. We discussed saline rinses.      HTN- she's been taking her hctz. Pt has declined adding medications previously. Her blood pressure has been elevated, and she did not take her medications today. Nov 2023 update: agreed to continue on amlodipine 2.5mg.      Cholesterol- she's not taking her medication. Some more body aches. Feb 2022 update: not taking crestor now. she is taking zetia. Feb 24, 2022- restarted crestor. Nov 2023 update: off and on with her taking crestor. We agreed she could at least try it every other day.         Rheum-she will not have a rheumatology visit until January 2021. She states that she is having shoulder pain, which was not improved after a January 2020 subacromial bursa injection here in the office. She is also having vague right elbow pain and other aches and pains that may be related to her mixed connective tissue disorder. May 13, 2021 update: was having pain recently and she went to [facility] for rehab. put back on hydroxychloroquine. Feb 2022 update- back on hydroxychloroquine. March 2022- last visit, still on hydroxychloroquine. Feb 2023 update: still taking hydroxychloroquine.         SH: rare cig smoking. stopped etoh. no idu.      Flu shot: declines flu shot     Mammogram: Oct 2020.      COVID: never tested positive. unlikely to take vaccine.    .       Review of Systems:    Negative  Constitutional: fever, chills, night sweats, unexpected weight change, changes in sleep  Eyes: loss of vision, double vision, floaters  Ear/Nose/Throat/Mouth: sore throat, hearing changes, sinus congestion  Psychological: feelings of depression, feelings of anxiety.  Endocrine: excessive thirst, feeling too hot,  "feeling too cold, feeling tired, fatigue  Hematologic/Lymphatic: swollen glands, blood clotting problems, easy bruising.   Neurological: loss of consciousness, tremors, dizzy spells, numbness, tingling.  Gastrointestinal: abdominal pain, nausea, vomiting, indigestion, heartburn, sour taste in throat when waking up, constipation, diarrhea, bloody stools, loss of bowel control  Genitourinary: urinary incontinence, increased urinary frequency, painful urination, blood in urine  Skin: Rash, lumps or bumps, worrisome moles  Musculoskeletal: bone pain, muscle pain, joint pain  Sexual: sexual health concerns      Positive  Psychological: feeling generally happy, feeling safe at home      Last Recorded Vitals:  Vitals:    11/22/23 1439   BP: (!) 169/116   Pulse: 109   Weight: 70.3 kg (155 lb)   Height: 1.549 m (5' 1\")       Past Medical History:  She has a past medical history of Anesthesia of skin, Anxiety disorder, unspecified, Localized enlarged lymph nodes (01/17/2019), Myalgia, unspecified site, Other conditions influencing health status (03/31/2015), Personal history of other diseases of the circulatory system, Personal history of other mental and behavioral disorders, Personal history of other specified conditions, and Pure hypercholesterolemia, unspecified.    Past Surgical History:  She has a past surgical history that includes Tubal ligation (01/10/2014) and Hysterectomy (01/10/2014).      Social History:  She has no history on file for tobacco use, alcohol use, and drug use.    Family History:  No family history on file.     Allergies:  Latex and Penicillins    Outpatient Medications:  Current Outpatient Medications   Medication Instructions    amLODIPine (Norvasc) 5 mg tablet 1 tablet DAILY (route: oral)    ascorbic acid (Vitamin C) 250 MG chewable tablet 1 tablet, oral, Daily    BACILLUS COAGULANS-INULIN ORAL oral    benzoyl peroxide 5 % lotion 1 application    cefTRIAXone (Rocephin) 2 gram     cephalexin " (KEFTAB) 500 mg, oral, 3 times daily    cholecalciferol (Vitamin D-3) 25 MCG (1000 UT) tablet 1 tablet, oral, Daily    clindamycin (Cleocin) 300 mg capsule     coenzyme Q-10 100 mg capsule oral    cyclobenzaprine (Flexeril) 10 mg tablet oral, 3 times daily PRN    diclofenac sodium 1 % kit transdermal    Diflucan 150 mg tablet oral, Daily RT    doxycycline (Vibramycin) 100 mg capsule oral, Every 12 hours    ezetimibe (Zetia) 10 mg tablet 1 tablet, oral, Daily    fluoride, sodium, (DentaGeL) 1.1 % gel dental    hydroCHLOROthiazide (HYDRODIURIL) 25 mg, oral, Daily, as directed    hydrocortisone 2.5 % ointment 1 Application    hydroxychloroquine (Plaquenil) 200 mg tablet oral    ketoconazole (NIZOral) 2 % cream 1 application    L. acidophilus/Bifid. animalis 15.5 billion cell capsule oral    lidocaine (Xylocaine) 2 % solution 1.25 mL, oral, As needed    meloxicam (Mobic) 7.5 mg tablet 1 tablet, oral, 2 times daily PRN    omega-3 fatty acids-fish oil (One-Per-Day Omega-3) 684-1,200 mg capsule oral    ondansetron ODT (Zofran-ODT) 4 mg disintegrating tablet TAKE 1 TABLET EVERY 6HRS AS NEEDED FOR NAUSEA    potassium chloride CR 20 mEq ER tablet 20 mEq, oral, Daily    predniSONE (DELTASONE) 40 mg, oral, Daily    rosuvastatin (Crestor) 5 mg tablet 1 tablet DAILY (route: oral)    tacrolimus (Protopic) 0.1 % ointment 1 Application    traMADol (Ultram) 50 mg tablet     triamcinolone (Kenalog) 0.1 % ointment APPLY AND GENTLY MASSAGE INTO AFFECTED AREA on left knee twice daily until the areas is flat/smooth       Physical Exam:  GENERAL: Well developed, well nourished, alert and cooperative, and appears to be in no acute distress.  PSYCH: mood pleasant and appropriate  HEAD: normocephalic  Heart: Regular rate and rhythm  Lungs: Clear to auscultation bilaterally.  Gait: Normal       Last Labs:  CBC -  Lab Results   Component Value Date    WBC 6.7 06/13/2023    HGB 12.6 06/13/2023    HCT 38.2 06/13/2023    MCV 96 06/13/2023    PLT  355 06/13/2023       CMP -  Lab Results   Component Value Date    CALCIUM 10.3 06/13/2023    PHOS 3.5 01/17/2023    PROT 8.0 06/13/2023    PROT 8.0 06/13/2023    ALBUMIN 4.2 06/13/2023    AST 15 06/13/2023    ALT 8 06/13/2023    ALKPHOS 56 06/13/2023    BILITOT 0.3 06/13/2023       LIPID PANEL -   Lab Results   Component Value Date    CHOL 242 (H) 02/15/2023    TRIG 135 02/15/2023    HDL 54.3 02/15/2023    CHHDL 4.5 02/15/2023    LDLF 161 (H) 02/15/2023    VLDL 27 02/15/2023         Lab Results   Component Value Date    HGBA1C 5.4 10/14/2020       1. Chest pressure              We had a discussion today that although your chest pressure may be due to an upper respiratory tract infection, which I did prescribe a Z-Benigno for.  That your presentation was also concerning for potential acute coronary syndrome and I did recommend evaluation in the emergency department.  You and her daughter voiced understanding of this plan.    Assuming you start feeling better and your emergency department work-up is unremarkable, phone or virtual follow-up after your emergency department visit and follow-up in the office in 4 to 6 months.    SHANNON PGY-1 in room for visit.    Carmelo Lopez, DO

## 2023-11-22 NOTE — ED PROVIDER NOTES
HPI   Chief Complaint   Patient presents with    Shortness of Breath    Chest Pain       HPI: 58-year-old female with a history of HTN and HLD presents for cough and shortness of breath.  For the past approximately 10 days she has had cough which is dry and shortness of breath.  She reports mild chest tightness when she is coughing.  She denies leg swelling history of DVT/PE.  She denies sick contacts or recent travel.      Limitations to history: None  Independent Historians: Patient  External Records Reviewed: HIE, outpatient notes, inpatient notes  ------------------------------------------------------------------------------------------------------------------------------------------  ROS: a ten point review of systems was performed and was negative except as per HPI.  ------------------------------------------------------------------------------------------------------------------------------------------  PMH / PSH: as per HPI, otherwise reviewed in EMR  MEDS: as per HPI, otherwise reviewed in EMR  ALLERGIES: as per HPI, otherwise reviewed in EMR  SocH:  as per HPI, otherwise reviewed in EMR  FH:  as per HPI, otherwise reviewed in EMR  ------------------------------------------------------------------------------------------------------------------------------------------  Physical Exam:  VS: As documented in the triage note and EMR flowsheet from this visit was reviewed  General: Well appearing. No acute distress.   Eyes:  Extraocular movements grossly intact. No scleral icterus. No discharge  HEENT:  Normocephalic.  Atraumatic  Neck: Moves neck freely. No gross masses  CV: Regular rhythm. No murmurs, rubs or gallops   Resp: Mild bilateral expiratory wheezing.  No respiratory distress or accessory muscle use  GI: Soft, no masses, nontender. No rebound tenderness or guarding  MSK: Symmetric muscle bulk. No deformities. No lower extremity edema.    Skin: Warm, dry, intact.   Neuro: No focal deficits.  A&O x3.    Psych: Appropriate for situation  ------------------------------------------------------------------------------------------------------------------------------------------  Hospital Course / Medical Decision Making:  Independent Interpretations: CXR  EKG as interpreted by me: Sinus tachycardia 108 bpm with a left axis deviation, no bundle branch block and no signs of acute ischemia    MDM: This is a 58-year-old female with a history of HTN and HLD presenting for cough and shortness of breath.  She is negative for influenza and COVID-19.  D-dimer not elevated.  Troponin nonelevated.  Chest x-ray shows no acute cardiopulmonary process.  Her presentation is consistent with bronchitis.  She was provided with a prescription for albuterol.  She feels safe going home.  She is hemodynamically stable and not hypoxic.  She will follow-up with her primary care physician as soon as possible and return to the ED for any worsening of her symptoms.    Discussion of Management with Other Providers:   I discussed the patient/results with: Emergency medicine team    Final diagnosis and disposition as below.    Results for orders placed or performed during the hospital encounter of 11/22/23  -CBC and Auto Differential:        Result                      Value             Ref Range           WBC                         8.0               4.4 - 11.3 x*       nRBC                        0.0               0.0 - 0.0 /1*       RBC                         4.06              4.00 - 5.20 *       Hemoglobin                  13.3              12.0 - 16.0 *       Hematocrit                  39.2              36.0 - 46.0 %       MCV                         97                80 - 100 fL         MCH                         32.8              26.0 - 34.0 *       MCHC                        33.9              32.0 - 36.0 *       RDW                         13.2              11.5 - 14.5 %       Platelets                   304               150 - 450  x1*       Neutrophils %               59.9              40.0 - 80.0 %       Immature Granulocytes *     0.4               0.0 - 0.9 %         Lymphocytes %               26.9              13.0 - 44.0 %       Monocytes %                 7.4               2.0 - 10.0 %        Eosinophils %               4.9               0.0 - 6.0 %         Basophils %                 0.5               0.0 - 2.0 %         Neutrophils Absolute        4.77              1.20 - 7.70 *       Immature Granulocytes *     0.03              0.00 - 0.70 *       Lymphocytes Absolute        2.14              1.20 - 4.80 *       Monocytes Absolute          0.59              0.10 - 1.00 *       Eosinophils Absolute        0.39              0.00 - 0.70 *       Basophils Absolute          0.04              0.00 - 0.10 *  -Comprehensive metabolic panel:        Result                      Value             Ref Range           Glucose                     91                74 - 99 mg/dL       Sodium                      138               136 - 145 mm*       Potassium                   3.3 (L)           3.5 - 5.3 mm*       Chloride                    103               98 - 107 mmo*       Bicarbonate                 25                21 - 32 mmol*       Anion Gap                   13                10 - 20 mmol*       Urea Nitrogen               12                6 - 23 mg/dL        Creatinine                  0.66              0.50 - 1.05 *       eGFR                        >90               >60 mL/min/1*       Calcium                     10.1              8.6 - 10.3 m*       Albumin                     4.2               3.4 - 5.0 g/*       Alkaline Phosphatase        59                33 - 110 U/L        Total Protein               8.3 (H)           6.4 - 8.2 g/*       AST                         20                9 - 39 U/L          Bilirubin, Total            0.4               0.0 - 1.2 mg*       ALT                         13                7 - 45 U/L      -Troponin I, High Sensitivity:        Result                      Value             Ref Range           Troponin I, High Sensi*     4                 0 - 13 ng/L    -Sars-CoV-2 and Influenza A/B PCR:        Result                      Value             Ref Range           Flu A Result                Not Detected      Not Detected        Flu B Result                Not Detected      Not Detected        Coronavirus 2019, PCR       Not Detected      Not Detected   -D-dimer, quantitative:        Result                      Value             Ref Range           D-Dimer Non VTE, Quant*     373               <=500 ng/mL *  XR chest 2 views   Final Result    1.  No active cardiopulmonary process.                Signed by: Javier Preston 11/22/2023 4:59 PM    Dictation workstation:   [id number]                               No data recorded                Patient History   Past Medical History:   Diagnosis Date    Anesthesia of skin     Numbness    Anxiety disorder, unspecified     Anxiety    Localized enlarged lymph nodes 01/17/2019    Cervical lymphadenopathy    Myalgia, unspecified site     Muscle pain    Other conditions influencing health status 03/31/2015    Cyst    Personal history of other diseases of the circulatory system     History of hypertension    Personal history of other mental and behavioral disorders     History of depression    Personal history of other specified conditions     History of weakness    Pure hypercholesterolemia, unspecified     High cholesterol     Past Surgical History:   Procedure Laterality Date    HYSTERECTOMY  01/10/2014    Hysterectomy    TUBAL LIGATION  01/10/2014    Tubal Ligation     No family history on file.  Social History     Tobacco Use    Smoking status: Not on file    Smokeless tobacco: Not on file   Substance Use Topics    Alcohol use: Not on file    Drug use: Not on file       Physical Exam   ED Triage Vitals [11/22/23 1529]   Temp Heart Rate Resp BP   36.7 °C (98.1 °F)  (!) 114 21 (!) 161/108      SpO2 Temp Source Heart Rate Source Patient Position   98 % Temporal Monitor Sitting      BP Location FiO2 (%)     Left arm --       Physical Exam    ED Course & MDM   Diagnoses as of 11/28/23 1139   Bronchitis       Medical Decision Making      Procedure  Procedures     Nixon Hinson,   11/28/23 1141

## 2023-12-17 NOTE — PROGRESS NOTES
Patient reportedly called the hospital  to speak with the ENT.  The  and myself tried to reach the patient and were left with voicemail.    Patient called back: She notes some subtle swelling to her area of her previous left submandibular gland.  This does not bother her too much but she has noticed it. No fevers, not erythematous, denies tenderness to palpation currently. She notes subtle swelling to her face for the past three days which has not increased in size.  Denies any drainage.  She notes she has an appointment later this week.  Patient instructed to call the office to see if she can get a sooner appointment, apparently the office is already working on trying to squeeze her in sooner.  Discussed with patient to come to the ED if develops fevers, drainage out of the site, worsening swelling.  She is able to tolerate a diet currently.

## 2023-12-21 NOTE — PROGRESS NOTES
Provider Impressions     That patient is status post surgery for sialolithiasis of the submandibular gland.  I cannot appreciate anything related to that.  What I see on examination today is a probable dental abscess of her last mandibular molar on the left side.  She is put on clindamycin.  She was also instructed to see her dentist.    I will see her on a as needed basis.      Chief Complaint     Follow-up status post surgery for sialolithiasis of the submandibular gland.      History of Present Illness    This patient was admitted to the hospital with an abscess in the parapharyngeal and submandibular region from a submandibular gland sialolithiasis. This was drained surgically both externally and transorally. A CT scan of the neck was done. It did show a stone in the duct as well as a stone in the head of the gland. On May 10, 2023 she underwent surgery to address both the submandibular gland as well as the intraoral stones. She has had some discomfort around the surgical site on occasion.  She was feeling better but approximately 8 days ago she started having a lot more discomfort around the area.  She took a Zithromax but it really did not help.  She describes a fairly significant discomfort involving her upper neck and throat and extending to the ear.    Physical Exam    Examination of the oral cavity and oropharynx is negative except for an area of ulceration on the lingual aspect of the mandible on the left posterior side.  This seems to be dental related.     I cannot appreciate anything else on examination of the oral cavity and neck.

## 2023-12-22 ENCOUNTER — OFFICE VISIT (OUTPATIENT)
Dept: OTOLARYNGOLOGY | Facility: HOSPITAL | Age: 58
End: 2023-12-22
Payer: COMMERCIAL

## 2023-12-22 VITALS — WEIGHT: 156.7 LBS | HEIGHT: 61 IN | TEMPERATURE: 96.3 F | BODY MASS INDEX: 29.59 KG/M2

## 2023-12-22 DIAGNOSIS — K13.70 LESION OF MOUTH: Primary | ICD-10-CM

## 2023-12-22 DIAGNOSIS — K04.7 DENTAL ABSCESS: ICD-10-CM

## 2023-12-22 PROCEDURE — 99213 OFFICE O/P EST LOW 20 MIN: CPT | Performed by: OTOLARYNGOLOGY

## 2023-12-22 PROCEDURE — 1036F TOBACCO NON-USER: CPT | Performed by: OTOLARYNGOLOGY

## 2023-12-22 RX ORDER — ZINC GLUCONATE 50 MG
TABLET ORAL DAILY
COMMUNITY

## 2023-12-22 RX ORDER — CLINDAMYCIN HYDROCHLORIDE 300 MG/1
300 CAPSULE ORAL 3 TIMES DAILY
Qty: 30 CAPSULE | Refills: 0 | Status: SHIPPED | OUTPATIENT
Start: 2023-12-22 | End: 2024-01-01

## 2024-01-04 ENCOUNTER — OFFICE VISIT (OUTPATIENT)
Dept: OBSTETRICS AND GYNECOLOGY | Facility: CLINIC | Age: 59
End: 2024-01-04
Payer: COMMERCIAL

## 2024-01-04 VITALS
HEIGHT: 61 IN | WEIGHT: 159 LBS | SYSTOLIC BLOOD PRESSURE: 112 MMHG | BODY MASS INDEX: 30.02 KG/M2 | DIASTOLIC BLOOD PRESSURE: 60 MMHG

## 2024-01-04 DIAGNOSIS — Z01.419 WOMEN'S ANNUAL ROUTINE GYNECOLOGICAL EXAMINATION: Primary | ICD-10-CM

## 2024-01-04 DIAGNOSIS — Z12.31 ENCOUNTER FOR SCREENING MAMMOGRAM FOR MALIGNANT NEOPLASM OF BREAST: ICD-10-CM

## 2024-01-04 PROCEDURE — 99396 PREV VISIT EST AGE 40-64: CPT | Performed by: OBSTETRICS & GYNECOLOGY

## 2024-01-04 PROCEDURE — 1036F TOBACCO NON-USER: CPT | Performed by: OBSTETRICS & GYNECOLOGY

## 2024-01-04 PROCEDURE — 3074F SYST BP LT 130 MM HG: CPT | Performed by: OBSTETRICS & GYNECOLOGY

## 2024-01-04 PROCEDURE — 3078F DIAST BP <80 MM HG: CPT | Performed by: OBSTETRICS & GYNECOLOGY

## 2024-01-04 ASSESSMENT — PAIN SCALES - GENERAL: PAINLEVEL: 0-NO PAIN

## 2024-01-04 NOTE — PROGRESS NOTES
Prema Castañeda is a 58 y.o. female who is here for a routine exam. PCP = Carmelo Lopez, DO  Here for annual exam no complaints.  Did have some itching but seems improved.  No discharge.  Sexually active with steady partner    Review of Systems  Had some vaginal itching few days ago.  Seems of improved.  All other systems negative    Physical Exam  Constitutional:       Appearance: Normal appearance. She is normal weight.   Genitourinary:      Rectum normal.      Genitourinary Comments: External genitalia unremarkable  Vagina clear cuff intact  Cervix and uterus surgically absent  No adnexal masses  Perineum without lesions or swelling    Breast without masses or tenderness no nipple discharge.  Normal appearance        Uterus is anteverted.   Breasts:     Breasts are soft.     Right: Normal.      Left: Normal.   HENT:      Head: Normocephalic.      Nose: Nose normal.   Eyes:      Pupils: Pupils are equal, round, and reactive to light.   Cardiovascular:      Rate and Rhythm: Normal rate and regular rhythm.   Pulmonary:      Effort: Pulmonary effort is normal.      Breath sounds: Normal breath sounds.   Abdominal:      General: Abdomen is flat. Bowel sounds are normal.      Palpations: Abdomen is soft.   Musculoskeletal:         General: Normal range of motion.      Cervical back: Normal range of motion and neck supple.   Neurological:      General: No focal deficit present.      Mental Status: She is alert.   Skin:     General: Skin is warm and dry.   Psychiatric:         Mood and Affect: Mood normal.         Behavior: Behavior normal.         Thought Content: Thought content normal.         Judgment: Judgment normal.     External genitalia unremarkable  Vagina clear  Vaginal cuff closed intact  Cervix and uterus surgically absent  Adnexa without masses or tenderness  Perineum without lesions    Breast without masses tenderness or nipple discharge, normal appearance    Objective   /60   Ht 1.549 m (5'  "1\")   Wt 72.1 kg (159 lb)   BMI 30.04 kg/m²   OB History    No obstetric history on file.          GynHx:  Status post hysterectomy    Social History     Substance and Sexual Activity   Sexual Activity Not on file   Sexually active steady partner    STIs: none    Substance:   Tobacco Use: Medium Risk (12/22/2023)    Patient History     Smoking Tobacco Use: Former     Smokeless Tobacco Use: Never     Passive Exposure: Not on file      Social History     Substance and Sexual Activity   Drug Use Not on file      Social History     Substance and Sexual Activity   Alcohol Use None     Abuse: No  Depression Screen:   Denies any depression symptoms    Past med hx and past surg hx reviewed and notable for: Hysterectomy    Assessment/Plan      Unremarkable annual GYN exam.  Patient is sexually active with steady partner status post hysterectomy.  Discussed diet exercise calcium vitamin D.  Mammogram ordered.  Patient will have bone density ordered by primary care per patient.  Return to office in 1 year or as needed  "

## 2024-02-28 ENCOUNTER — TELEPHONE (OUTPATIENT)
Dept: OTOLARYNGOLOGY | Facility: CLINIC | Age: 59
End: 2024-02-28
Payer: COMMERCIAL

## 2024-02-28 ENCOUNTER — TELEPHONE (OUTPATIENT)
Dept: PRIMARY CARE | Facility: CLINIC | Age: 59
End: 2024-02-28
Payer: COMMERCIAL

## 2024-02-28 DIAGNOSIS — K11.5 SALIVARY DUCT STONE: Primary | ICD-10-CM

## 2024-02-28 DIAGNOSIS — K04.7 DENTAL ABSCESS: ICD-10-CM

## 2024-02-28 RX ORDER — MELOXICAM 7.5 MG/1
7.5 TABLET ORAL 2 TIMES DAILY PRN
Qty: 30 TABLET | Refills: 0 | Status: SHIPPED | OUTPATIENT
Start: 2024-02-28 | End: 2024-05-28 | Stop reason: WASHOUT

## 2024-02-28 RX ORDER — CLINDAMYCIN HYDROCHLORIDE 300 MG/1
300 CAPSULE ORAL 3 TIMES DAILY
Qty: 30 CAPSULE | Refills: 0 | Status: SHIPPED | OUTPATIENT
Start: 2024-02-28 | End: 2024-03-09

## 2024-02-28 NOTE — TELEPHONE ENCOUNTER
Patient states she is experiencing tightness after removal of saliva gland. Patient would like to discuss this with you.     12/28/2024: 4:29 PM.  Patient still has a sensation in her mouth near the salivary gland that had a procedure done by ENT.  ENT recommended dental and oral surgery.  She does have an oral surgery appointment she is already seeing dental.  Agreed that she will do the lidocaine mouth topical medication and we agreed on the meloxicam prescription.

## 2024-02-28 NOTE — PROGRESS NOTES
Pt will try oral lidocaine for mouth feeling.    Meloxicam rx as well.   She will keep her specialist visits.

## 2024-02-28 NOTE — TELEPHONE ENCOUNTER
Prema called and left me a message at 9:07 a.m. stating that at her apt in January Dr. Alvarado put her on an ATB and told her to see her dentist as she might have a dental abscess.  She sated she did see the dentist and they found nothing.  They are sending her to an oral surgeon to further evaluate but that apt isn't until 3/15/24.  She reports her symptoms are back and would like another round of ATB.    I called and spoke with her about 11 a.m. to see if she reached out to the dentist.  I suggested that so that they can evaluate her while she is having symptoms-vs-after the antibiotics are on board and have resolved the symptoms.  She stated she had not, but she will give them a call.  I informed her that I would review things with Dr. Alvarado and we would touch base again this afternoon.  She was agreeable with said plan    I reviewed her situation with Dr. Alvarado.  He was agreeable to another round of ATB if dental couldn't see her.    I called her back this afternoon.  She reported they still couldn't see her but they did move up her apt to 3/9/24.  I informed her that we will send in another course of antibiotics for her.  She stated she really didn't want to take them, but she's pretty uncomfortable.      Pharmacy confirmed and prescription pended for Dr. Alvarado's authorization.    Ms. Castañeda was very appreciative.

## 2024-03-05 ENCOUNTER — APPOINTMENT (OUTPATIENT)
Dept: INTEGRATIVE MEDICINE | Facility: CLINIC | Age: 59
End: 2024-03-05

## 2024-03-26 ENCOUNTER — TELEPHONE (OUTPATIENT)
Dept: PRIMARY CARE | Facility: CLINIC | Age: 59
End: 2024-03-26
Payer: COMMERCIAL

## 2024-03-26 DIAGNOSIS — I10 ESSENTIAL HYPERTENSION: Primary | ICD-10-CM

## 2024-03-26 RX ORDER — HYDROCHLOROTHIAZIDE 25 MG/1
25 TABLET ORAL DAILY
Qty: 90 TABLET | Refills: 1 | Status: SHIPPED | OUTPATIENT
Start: 2024-03-26

## 2024-05-06 NOTE — OP NOTE
PREOPERATIVE DIAGNOSIS:  Sialolithiasis of left submandibular gland and submandibular duct.    POSTOPERATIVE DIAGNOSIS:  Sialolithiasis of left submandibular gland and submandibular duct.    OPERATION/PROCEDURE:  Excision of a submandibular gland and transoral removal of salivary  stone and marsupialization of the submandibular duct.     SURGEON:  Rashid Alvarado MD.    ASSISTANT(S):    ANESTHESIA:    OPERATIVE INDICATION:  This patient presented with a parapharyngeal abscess secondary to an  infection of the submandibular gland secondary to the presence of  stones.  She is here today to have this addressed surgically.  She  understands surgery and possible complication and wishes to proceed.     DESCRIPTION OF OPERATION:  Under general anesthesia, the patient was properly positioned,  prepped and draped in the usual sterile fashion.  A curvilinear  incision was outlined in the submandibular area.  The prior neck  abscess incision was extended both anteriorly and posteriorly.  Incision was made, carried through underlying soft tissue through the  platysma.  The submandibular gland was identified.  Care was taken to  minimize injury to the marginal branch of the facial.  Dissection was  carried in the plane of the gland.  There was a fair amount of  fibrosis around the area.  The gland was freed in every direction.  The mylohyoid was identified anteriorly.  The facial vein was clipped  and the facial artery was clipped, but at the same time, there was a  cut made in the artery that caused a fair amount of bleeding that  took a fair amount of time to control because of the amount of  scarring around the area.  The artery was finally controlled.  There  was no further blood loss.  The gland was further dissected and the  duct was identified.  The lingual nerve was not very well seen but  was felt to be retracted superiorly.  The duct was divided and the  gland was removed.  Hemostasis was verified.  A few other  bleeders  were identified.  The transoral portion was then carried out.  A  retractor was placed.  The anterior floor of the mouth was exposed.  The duct was dissected about 2 cm extending posteriorly, stones could  be palpated through the duct.  The duct was opened up posterior to  that area and stitches were made to marsupialize the duct.  4-0  Vicryl sutures were placed to the distal aspect of the duct with the  2 embedded stones were then removed.  Hemostasis was verified and we  went back to the neck.  The wound was irrigated.  There was no  further bleeding.  Aylin was placed to the wound.  The wound was  closed in 2 planes, subcutaneous plane with inverting 2-0 Vicryl  suture and the skin with a running 5-0 fast.  The estimated blood  loss was approximately 75 cc and the patient was sent back to  Recovery in fair condition.       Rashid Alvarado MD    DD:  05/10/2023 09:55:02 EST  DT:  05/11/2023 04:04:59 EST  DICTATION NUMBER:  [id number]  INTERNAL JOB NUMBER:  [id number]        Electronic Signatures:  Rashid Alvarado) (Signed on 11-May-2023 18:19)   Authored  Unsigned, Draft (SYS GENERATED) (Entered on 11-May-2023 04:04)   Entered    Last Updated: 11-May-2023 18:19 by Rashid Alvarado)

## 2024-05-14 ENCOUNTER — OFFICE VISIT (OUTPATIENT)
Dept: PRIMARY CARE | Facility: CLINIC | Age: 59
End: 2024-05-14
Payer: COMMERCIAL

## 2024-05-14 VITALS
HEART RATE: 102 BPM | DIASTOLIC BLOOD PRESSURE: 106 MMHG | WEIGHT: 167 LBS | SYSTOLIC BLOOD PRESSURE: 152 MMHG | HEIGHT: 61 IN | BODY MASS INDEX: 31.53 KG/M2

## 2024-05-14 DIAGNOSIS — M35.9 UNDIFFERENTIATED CONNECTIVE TISSUE DISEASE (MULTI): ICD-10-CM

## 2024-05-14 DIAGNOSIS — K13.70 LESION OF MOUTH: ICD-10-CM

## 2024-05-14 DIAGNOSIS — I10 ESSENTIAL HYPERTENSION: Primary | ICD-10-CM

## 2024-05-14 PROCEDURE — 99214 OFFICE O/P EST MOD 30 MIN: CPT | Performed by: FAMILY MEDICINE

## 2024-05-14 PROCEDURE — 1036F TOBACCO NON-USER: CPT | Performed by: FAMILY MEDICINE

## 2024-05-14 PROCEDURE — 3080F DIAST BP >= 90 MM HG: CPT | Performed by: FAMILY MEDICINE

## 2024-05-14 PROCEDURE — 3077F SYST BP >= 140 MM HG: CPT | Performed by: FAMILY MEDICINE

## 2024-05-14 RX ORDER — AMLODIPINE BESYLATE 10 MG/1
10 TABLET ORAL DAILY
Qty: 90 TABLET | Refills: 1 | Status: SHIPPED | OUTPATIENT
Start: 2024-05-14 | End: 2024-11-10

## 2024-05-14 RX ORDER — ROSUVASTATIN CALCIUM 5 MG/1
TABLET, COATED ORAL
Qty: 90 TABLET | Refills: 1 | Status: SHIPPED | OUTPATIENT
Start: 2024-05-14

## 2024-05-14 ASSESSMENT — ENCOUNTER SYMPTOMS
OCCASIONAL FEELINGS OF UNSTEADINESS: 0
DEPRESSION: 0
LOSS OF SENSATION IN FEET: 0

## 2024-05-14 NOTE — PROGRESS NOTES
Pt is here for issues with previous surg      Chief Complaint:  No chief complaint on file.  History Of Present Illness:   Prema Castañeda is a 58 y.o. female      Prema Castañeda is a 58 y.o. female presenting with a history of HTN, hyperlipidemia and connective tissue disorder and internal hemorrhoids, Jan 2023- surgery for submandibular/parapharyngeal abscesses, who is presenting for a sick visit              Salivary glands and face swelling- Jan 2023.   Jan 2023- surgeries for left submandibular and parapharyngeal abscess. sent home with PICC line for antibiotics.   Feb 15, 2023 update: Was having some symptoms with antibiotics and just went back on antibiotics last week. Muscles hurting, potassium levels were off.   March 8, 2023- will have salivary gland stone removal surgery planned.   Nov 2023 update- some taste changes with spicy foods, used numbing mouth wash that is helpful.   May 2024 update: checked with ENT in Dec 2023, also saw Dentist. She still feels on the left side of her chin area, she feels pain and it feels like stiffness and she still feels something in her left cheek.      Pain in right hand. Thumb. 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 Right 1st CMC joint.             HTN- she's been taking her hctz. Pt has declined adding medications previously. Her blood pressure has been elevated, and she did not take her medications today. Nov 2023 update: agreed to continue on amlodipine 2.5mg. May 2024- elevated. Agreed to inc amlodipine to 5mg.      Cholesterol- she's not taking her medication. Some more body aches. Feb 2022 update: not taking crestor now. she is taking zetia. Feb 24, 2022- restarted crestor. Nov 2023 update: off and on with her taking crestor. We agreed she could at least try it every other day. May 2024- refilling crestor.         Rheum-she will not have a rheumatology visit until January 2021. She states that she is having shoulder pain, which was not  improved after a January 2020 subacromial bursa injection here in the office. She is also having vague right elbow pain and other aches and pains that may be related to her mixed connective tissue disorder. May 13, 2021 update: was having pain recently and she went to [facility] for rehab. put back on hydroxychloroquine. Feb 2022 update- back on hydroxychloroquine. March 2022- last visit, still on hydroxychloroquine. Feb 2023 update: still taking hydroxychloroquine. May 2024 update: not on hydroxychloroquine, wondering about most recent test results.         SH: rare cig smoking. stopped etoh. no idu.      Flu shot: declines flu shot     Mammogram: Oct 2020.      COVID: never tested positive. unlikely to take vaccine.       Previous visits:    Feb 22, 2022- Right sided chest pain- moved to her right shoulder blade. started around 315pm and lasted all throughout the night. Used heating pad and nebulizer. Sleeping upright was more helpful. Now it is a dull pain. Not coughing as much today. Exertion does not effect chest pain.   Some chronic left jaw numbness.   Sept 2022 update: none recently.        Feb 16, 2022- Chest congestion and cough. Since last Wed. Wanted to make sure lungs and heart were ok. Granddaughter came home from school Tue with a cough. Then the next day she started getting sick. Cough: all throughout the day, not horrible. Coughing up phlegm and mucus. Some in chest. Was taking nyquil and vitamins. No concerns for COVID.   Sept 2022 update: good now.   Nov 2023 update: had a cold about a month ago, but she's been coughing since then. Sometimes productive, sometimes dry. We discussed saline rinses.        PSH: left cheek surgery Jan 2023.      Healthcare team:  - ENT  - dental  - GYN  - rheum         SH: house, daughter and 2 grandchildren.        Tobacco: states she was never a smoker, but admits she was around tobacco smoke.       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      Flu  "shot:  - declines     Immunizations:   - utd per pt.     Colonoscopy: 2016.                       Review of Systems:     Negative  Constitutional:   - fever   - chills   - night sweats  - unexpected weight change  - changes in sleep     Eyes:   - loss of vision  - double vision  - floaters          Cardiovascular:   - chest pain  - chest heaviness  - palpitations  - swelling in ankles       Respiratory:   - shortness of breath  - difficulty breathing  - frequent cough  - wheezing     Neurological:   - loss of consciousness  - tremors  - dizzy spells  - numbness   - tingling     Gastrointestinal:   - abdominal pain  - nausea  - vomiting  - constipation  - diarrhea  - bloody stools  - loss of bowel control  - indigestion  - heartburn  - sour taste in throat when waking up     Genitourinary:   - urinary incontinence  - increased urinary frequency  - painful urination  - blood in urine     Skin:   - Rash  - lumps or bumps  - worrisome moles     Endocrine:   - excessive thirst  - feeling too hot  - feeling too cold  - feeling tired  - fatigue      Hematologic/Lymphatic:   - swollen glands  - blood clotting problems  - easy bruising.     Psychological:   - feelings of depression  - feelings of anxiety.     Sexual:   - sexual health concerns        Positive  Psychological:   - feeling generally happy  - feeling safe at home            Last Recorded Vitals:  Vitals:    05/14/24 1448   BP: (!) 152/106   Pulse: 102   Weight: 75.8 kg (167 lb)   Height: 1.549 m (5' 1\")        Past Medical History:  She has a past medical history of Anesthesia of skin, Anxiety disorder, unspecified, Localized enlarged lymph nodes (01/17/2019), Myalgia, unspecified site, Other conditions influencing health status (03/31/2015), Personal history of other diseases of the circulatory system, Personal history of other mental and behavioral disorders, Personal history of other specified conditions, and Pure hypercholesterolemia, unspecified.     Past " Surgical History:  She has a past surgical history that includes Tubal ligation (01/10/2014) and Hysterectomy (01/10/2014).     Social History:  She reports that she has quit smoking. Her smoking use included cigarettes. She has never used smokeless tobacco. No history on file for alcohol use and drug use.     Family History:  No family history on file.  Allergies:  Latex and Penicillins     Outpatient Medications:  Current Outpatient Medications   Medication Instructions    albuterol 90 mcg/actuation inhaler 2 puffs, inhalation, Every 4 hours PRN    ascorbic acid (Vitamin C) 250 MG chewable tablet 1 tablet, oral, Daily    cholecalciferol (Vitamin D-3) 25 MCG (1000 UT) tablet 1 tablet, oral, Daily    coenzyme Q-10 100 mg capsule oral    cyclobenzaprine (Flexeril) 10 mg tablet oral, 3 times daily PRN    diclofenac sodium 1 % kit transdermal    ECHINACEA ORAL oral    ezetimibe (Zetia) 10 mg tablet 1 tablet, oral, Daily    fluoride, sodium, (DentaGeL) 1.1 % gel dental    hydroCHLOROthiazide (HYDRODIURIL) 25 mg, oral, Daily, as directed    meloxicam (MOBIC) 7.5 mg, oral, 2 times daily PRN    omega-3 fatty acids-fish oil (One-Per-Day Omega-3) 684-1,200 mg capsule oral    rosuvastatin (Crestor) 5 mg tablet 1 tablet DAILY (route: oral)    zinc gluconate 50 mg tablet oral, Daily        Physical Exam:  GENERAL: Well developed, well nourished, alert and cooperative, and appears to be in no acute distress.     PSYCH: mood pleasant and appropriate     HEAD: normocephalic     EYES: PERRL, EOMI. vision is grossly intact.      NOSE:  Nares patent b/l.   No bleeding nasal polyps. No nasal discharge.          CARDIAC:   RRR.   No murmur.       LUNGS: Good respiratory effort.  Clear to auscultation b/l without rales, rhonchi, wheezing.     ABD: soft, nontender       GAIT: Normal              Last Labs:  CBC -  Lab Results   Component Value Date    WBC 8.0 11/22/2023    HGB 13.3 11/22/2023    HCT 39.2 11/22/2023    MCV 97 11/22/2023      11/22/2023        CMP -  Lab Results   Component Value Date    CALCIUM 10.1 11/22/2023    PHOS 3.5 01/17/2023    PROT 8.3 (H) 11/22/2023    ALBUMIN 4.2 11/22/2023    AST 20 11/22/2023    ALT 13 11/22/2023    ALKPHOS 59 11/22/2023    BILITOT 0.4 11/22/2023        LIPID PANEL -  Lab Results   Component Value Date    CHOL 242 (H) 02/15/2023    TRIG 135 02/15/2023    HDL 54.3 02/15/2023    CHHDL 4.5 02/15/2023    LDLF 161 (H) 02/15/2023    VLDL 27 02/15/2023           Lab Results   Component Value Date    BNP 12 11/22/2023    HGBA1C 5.4 10/14/2020                 Assessment/Plan   Problem List Items Addressed This Visit             ICD-10-CM    Essential hypertension - Primary I10     Cont on hydrochlorothiazide  Increasing to 10mg daily amlodipine.  Check your bp at home and write down the readings.     Re-referral to ENT and rheum.    Follow up 4 months for blood pressure.         Carmelo Lopez DO

## 2024-05-20 RX ORDER — ASCORBIC ACID 1000 MG
175 TABLET ORAL DAILY
COMMUNITY

## 2024-05-20 RX ORDER — MULTIVITAMIN/IRON/FOLIC ACID 18MG-0.4MG
1 TABLET ORAL DAILY
COMMUNITY

## 2024-05-27 SDOH — ECONOMIC STABILITY: FOOD INSECURITY: WITHIN THE PAST 12 MONTHS, YOU WORRIED THAT YOUR FOOD WOULD RUN OUT BEFORE YOU GOT MONEY TO BUY MORE.: PATIENT DECLINED

## 2024-05-27 SDOH — SOCIAL STABILITY: SOCIAL NETWORK: I HAVE TROUBLE DOING ALL OF THE ACTIVITIES WITH FRIENDS THAT I WANT TO DO: SOMETIMES

## 2024-05-27 SDOH — SOCIAL STABILITY: SOCIAL NETWORK: I HAVE TROUBLE DOING ALL OF THE FAMILY ACTIVITIES THAT I WANT TO DO: SOMETIMES

## 2024-05-27 SDOH — ECONOMIC STABILITY: FOOD INSECURITY: WITHIN THE PAST 12 MONTHS, THE FOOD YOU BOUGHT JUST DIDN'T LAST AND YOU DIDN'T HAVE MONEY TO GET MORE.: PATIENT DECLINED

## 2024-05-27 SDOH — SOCIAL STABILITY: SOCIAL NETWORK: I HAVE TROUBLE DOING ALL OF MY USUAL WORK (INCLUDE WORK AT HOME): USUALLY

## 2024-05-27 SDOH — SOCIAL STABILITY: SOCIAL NETWORK

## 2024-05-27 SDOH — SOCIAL STABILITY: SOCIAL NETWORK: PROMIS ABILITY TO PARTICIPATE IN SOCIAL ROLES & ACTIVITIES T-SCORE: 43

## 2024-05-27 SDOH — SOCIAL STABILITY: SOCIAL NETWORK: I HAVE TROUBLE DOING ALL OF MY REGULAR LEISURE ACTIVITIES WITH OTHERS: SOMETIMES

## 2024-05-27 ASSESSMENT — PROMIS GLOBAL HEALTH SCALE
RATE_MENTAL_HEALTH: FAIR
RATE_QUALITY_OF_LIFE: FAIR
CARRYOUT_SOCIAL_ACTIVITIES: FAIR
RATE_AVERAGE_PAIN: 8
RATE_SOCIAL_SATISFACTION: FAIR
RATE_GENERAL_HEALTH: FAIR
RATE_AVERAGE_FATIGUE: SEVERE
CARRYOUT_PHYSICAL_ACTIVITIES: MODERATELY
RATE_PHYSICAL_HEALTH: FAIR
EMOTIONAL_PROBLEMS: SOMETIMES

## 2024-05-27 ASSESSMENT — ANXIETY QUESTIONNAIRES
PAST MONTH HOW OFTEN HAVE YOU FELT THAT THINGS WERE GOING YOUR WAY: 2 - SOMETIMES
PAST MONTH HOW OFTEN HAVE YOU FELT THAT YOU WERE UNABLE TO CONTROL THE IMPORTANT THINGS IN YOUR LIFE: 2 - SOMETIMES
PAST MONTH HOW OFTEN HAVE YOU FELT DIFFICULTIES WERE PILING UP SO HIGH THAT YOU COULD NOT OVERCOME THEM: 2 - SOMETIMES
PAST MONTH HOW OFTEN HAVE YOU FELT THAT YOU WERE UNABLE TO CONTROL THE IMPORTANT THINGS IN YOUR LIFE: 2 - SOMETIMES
PAST MONTH HOW OFTEN HAVE YOU FELT CONFIDENT ABOUT YOUR ABILITY TO HANDLE YOUR PROBLEMS: 2 - SOMETIMES
PAST MONTH HOW OFTEN HAVE YOU FELT CONFIDENT ABOUT YOUR ABILITY TO HANDLE YOUR PROBLEMS: 2 - SOMETIMES

## 2024-05-28 ENCOUNTER — TELEPHONE (OUTPATIENT)
Dept: PRIMARY CARE | Facility: CLINIC | Age: 59
End: 2024-05-28

## 2024-05-28 ENCOUNTER — ALLIED HEALTH (OUTPATIENT)
Dept: INTEGRATIVE MEDICINE | Facility: CLINIC | Age: 59
End: 2024-05-28
Payer: COMMERCIAL

## 2024-05-28 VITALS
SYSTOLIC BLOOD PRESSURE: 135 MMHG | BODY MASS INDEX: 31.57 KG/M2 | HEIGHT: 61 IN | HEART RATE: 118 BPM | WEIGHT: 167.2 LBS | DIASTOLIC BLOOD PRESSURE: 84 MMHG

## 2024-05-28 DIAGNOSIS — R53.82 CHRONIC FATIGUE: ICD-10-CM

## 2024-05-28 DIAGNOSIS — I10 ESSENTIAL HYPERTENSION: Primary | ICD-10-CM

## 2024-05-28 DIAGNOSIS — E78.2 MIXED HYPERLIPIDEMIA: ICD-10-CM

## 2024-05-28 DIAGNOSIS — M15.9 PRIMARY OSTEOARTHRITIS INVOLVING MULTIPLE JOINTS: ICD-10-CM

## 2024-05-28 DIAGNOSIS — E55.9 VITAMIN D DEFICIENCY: ICD-10-CM

## 2024-05-28 DIAGNOSIS — G89.29 CHRONIC BILATERAL LOW BACK PAIN WITH RIGHT-SIDED SCIATICA: ICD-10-CM

## 2024-05-28 DIAGNOSIS — M54.41 CHRONIC BILATERAL LOW BACK PAIN WITH RIGHT-SIDED SCIATICA: ICD-10-CM

## 2024-05-28 PROCEDURE — 99215 OFFICE O/P EST HI 40 MIN: CPT | Performed by: INTERNAL MEDICINE

## 2024-05-28 PROCEDURE — 99417 PROLNG OP E/M EACH 15 MIN: CPT | Performed by: INTERNAL MEDICINE

## 2024-05-28 ASSESSMENT — ENCOUNTER SYMPTOMS
APPETITE CHANGE: 0
DYSURIA: 0
ARTHRALGIAS: 0
WEAKNESS: 0
SLEEP DISTURBANCE: 1
HEADACHES: 0
FATIGUE: 1
BACK PAIN: 1
SHORTNESS OF BREATH: 0
FEVER: 0
DIFFICULTY URINATING: 0
NERVOUS/ANXIOUS: 0
MYALGIAS: 0
COUGH: 0

## 2024-05-28 NOTE — PATIENT INSTRUCTIONS
" Consider switching to soy milk instead of almond milk.   Try out edamame as a snack  Keep adding ground flax seed to your smoothies. Suggest 1 tablespoon ground flax seed daily.   Get your vitamin d level tested.   Try flax \"eggs\" instead of real eggs in baking.   Eat more fiber in the diet from whole grains, fruits, vegetables, beans/ lentils to help lower your cholesterol. Eat more plants!  Avoid high sodium foods. Read labels.   Eat fewer eggs per week because they are high in cholesterol.  Try out acupuncture.   10. Start to walk on a daily basis. It can help with chronic pain to move gently.     Follow up in 2 months  Rena Thao MD PhD     "

## 2024-05-28 NOTE — ASSESSMENT & PLAN NOTE
Advised lifestyle intervention- less animal products and lower saturated fat. Suggest more fiber in the diet. Discussed details. Check vitamin d in case this causes some muscle aches from the statin she is on.

## 2024-05-28 NOTE — PROGRESS NOTES
"Integrative Medicine Visit:     Subjective   Patient ID: Prema Castañeda is a 58 y.o. female who presents for Blood Pressure Check       HPI wants to know a more natural way to deal with the things that she is dealing with.  Had a saliva gland removed and two abscesses in her left neck last year. Still has some tenderness there at the surgery site.  Turned out to be dental abscess. She wonders if there is something she needs to do to care for the surgical area.     Hypertension: went to pcp and she had blood work taken. No results yet.     Hypercholesterol- thinks the medicine may make her ache but she takes it now.     Lower back pain: was told to exercise for this to help her pain. She did try exercise and found this helpful. No specific exercises for the back but rather wlaking exercises. Went to PT and they taught her some exericses but it was years ago. Not doing them. Has had back pain for a long time- about 15 years.   Rarely takes mobic for her pain or a flexeril. The pain helps a little bit.     In menopause- has not had a period in years because she had a hysterectomy but she has ovaries. Hot flashes very intense and having vaginal dryness.   Fatigue: feels tired all the time.  Seems like a longer part of the day she feels tired. Watns to do things but does not have the energy.      No results found for: \"PFOGXVRW23\"   No results found for: \"VITD25\"    CONCERNS:  wants natural approach to her medical problems.     PMH:  salivary gland abscess  Rheumatoid arthritis and some type of lupus of the skin. Was on hydrochloroquine.   Skin lupus- needs to stay out of the sun. Was treated by derm with an ointment.   FamMH:  grand mother had diabetes  Mother  of cancer of the brain.   Not sure of Dad's medical issues he is .   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  " "Lives with her daughter and granddaughters. 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     NUTRITION: couple of deviled eggs and piece of steak leftover from holiday. Green Tea- sugar. Beef rib and greens and mac and cheese, deviled eggs for dinner ; hamburger for lunch- ketchep on a bun with chips, no fruit or veg; breakfast: skipped eysterday- generally skips. Drinks chlorophyl water.     Smoking:  former smoker, quit 1 years ago  STILL HAS passive smoke exposure    Alcohol use:  glass of wine with dinner 3 drinks per week. Wine or can of a mixed drink. Denies a problem with drinking.     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 Some trouble staying a sleep.  Has not trouble falling asleep. Typically goes to bed between 11 and 12 and up by 8 am.     STRESS MANAGEMENT: did not discuss  SUPPORT: daughter    Review of Systems   Constitutional:  Positive for fatigue. Negative for appetite change and fever.   HENT:  Negative for congestion and hearing loss.    Eyes:  Negative for visual disturbance.   Respiratory:  Negative for cough and shortness of breath.    Cardiovascular:  Negative for chest pain and leg swelling.   Genitourinary:  Negative for difficulty urinating, dysuria and urgency.   Musculoskeletal:  Positive for back pain (lower back pain and right hip pain). Negative for arthralgias (knees ache at night) and myalgias.   Skin:  Negative for rash.   Neurological:  Negative for weakness and headaches.   Psychiatric/Behavioral:  Positive for sleep disturbance. Negative for behavioral problems. The patient is not nervous/anxious.             Pain:    Objective   /84   Pulse (!) 118   Ht 1.549 m (5' 1\")   Wt 75.8 kg (167 lb 3.2 oz)   BMI 31.59 kg/m²       Physical Exam  HENT:      Head: Normocephalic and atraumatic.      Mouth/Throat:      Mouth: Mucous membranes are moist.   Cardiovascular:      Rate and Rhythm: Normal rate.   Pulmonary:    "   Effort: Pulmonary effort is normal. No respiratory distress.   Musculoskeletal:         General: No swelling or deformity.      Cervical back: Normal range of motion.      Comments: Now swelling of joints seen but patient reports pain of right first hand mcp joint   Skin:     General: Skin is dry.      Findings: No rash.   Neurological:      General: No focal deficit present.      Mental Status: She is alert and oriented to person, place, and time.   Psychiatric:      Comments: Normal affect                      Assessment/Plan     Problem List Items Addressed This Visit             ICD-10-CM    Back pain, chronic M54.9, G89.29    Relevant Orders    Referral to [facility]    Essential hypertension - Primary I10     Discussed low sodium diet  Discussed possible screening for sleep apnea  Discussed ground flax seeds and how it can help low blood pressure.          Fatigue R53.83    Relevant Orders    Vitamin B12    Hyperlipidemia E78.5     Advised lifestyle intervention- less animal products and lower saturated fat. Suggest more fiber in the diet. Discussed details. Check vitamin d in case this causes some muscle aches from the statin she is on.          Osteoarthritis, multiple sites M15.9    Relevant Orders    Referral to [facility]     Other Visit Diagnoses         Codes    Vitamin D deficiency     E55.9    Relevant Orders    Vitamin D 25-Hydroxy,Total (for eval of Vitamin D levels)            Offered PT for back pain, patient declines for now and wishes to try acupuncture. Explained briefly what acupuncture is and how it can be helpful for back pain. Recommend six once weekly visits of acupuncture as trial to see if this improves their back pain. Recommend continue more treatments if it is helpful. Discussed risks of acupuncture which include bleeding, bruising, dizziness. Recommend to eat and drink prior to acupuncture appointments. Patient questions answered.      Recommend Follow up in : 3  months    Rena Thao MD PhD    Time Spent  Prep time on day of patient encounter: 5 minutes  Time spent directly with patient, family or caregiver: 60 minutes  Additional Time Spent on Patient Care Activities: 0 minutes  Documentation Time: 5 minutes  Other Time Spent: 0 minutes  Total: 70 minutes

## 2024-05-28 NOTE — ASSESSMENT & PLAN NOTE
Discussed low sodium diet  Discussed possible screening for sleep apnea  Discussed ground flax seeds and how it can help low blood pressure.

## 2024-06-04 ENCOUNTER — TELEPHONE (OUTPATIENT)
Dept: PRIMARY CARE | Facility: CLINIC | Age: 59
End: 2024-06-04
Payer: COMMERCIAL

## 2024-06-04 NOTE — TELEPHONE ENCOUNTER
Pt would like to know her lab results, they aren't In the system and was told to speak with you. Thank you

## 2024-06-06 DIAGNOSIS — I10 ESSENTIAL HYPERTENSION: Primary | ICD-10-CM

## 2024-06-10 ENCOUNTER — APPOINTMENT (OUTPATIENT)
Dept: INTEGRATIVE MEDICINE | Facility: CLINIC | Age: 59
End: 2024-06-10
Payer: COMMERCIAL

## 2024-06-13 ENCOUNTER — APPOINTMENT (OUTPATIENT)
Dept: RHEUMATOLOGY | Facility: CLINIC | Age: 59
End: 2024-06-13
Payer: COMMERCIAL

## 2024-06-28 ENCOUNTER — APPOINTMENT (OUTPATIENT)
Dept: OBSTETRICS AND GYNECOLOGY | Facility: CLINIC | Age: 59
End: 2024-06-28
Payer: COMMERCIAL

## 2024-07-17 ENCOUNTER — APPOINTMENT (OUTPATIENT)
Dept: INTEGRATIVE MEDICINE | Facility: CLINIC | Age: 59
End: 2024-07-17
Payer: COMMERCIAL

## 2024-07-18 ENCOUNTER — OFFICE VISIT (OUTPATIENT)
Dept: OBSTETRICS AND GYNECOLOGY | Facility: CLINIC | Age: 59
End: 2024-07-18
Payer: COMMERCIAL

## 2024-07-18 VITALS
HEIGHT: 61 IN | SYSTOLIC BLOOD PRESSURE: 120 MMHG | BODY MASS INDEX: 31.15 KG/M2 | DIASTOLIC BLOOD PRESSURE: 80 MMHG | WEIGHT: 165 LBS

## 2024-07-18 DIAGNOSIS — N89.8 VAGINAL DRYNESS: ICD-10-CM

## 2024-07-18 DIAGNOSIS — N90.89 VULVAR LESION: Primary | ICD-10-CM

## 2024-07-18 PROCEDURE — 3074F SYST BP LT 130 MM HG: CPT | Performed by: OBSTETRICS & GYNECOLOGY

## 2024-07-18 PROCEDURE — 3079F DIAST BP 80-89 MM HG: CPT | Performed by: OBSTETRICS & GYNECOLOGY

## 2024-07-18 PROCEDURE — 3008F BODY MASS INDEX DOCD: CPT | Performed by: OBSTETRICS & GYNECOLOGY

## 2024-07-18 PROCEDURE — 99214 OFFICE O/P EST MOD 30 MIN: CPT | Performed by: OBSTETRICS & GYNECOLOGY

## 2024-07-18 ASSESSMENT — ENCOUNTER SYMPTOMS
GASTROINTESTINAL NEGATIVE: 0
MUSCULOSKELETAL NEGATIVE: 0
ENDOCRINE NEGATIVE: 0
ALLERGIC/IMMUNOLOGIC NEGATIVE: 0
CONSTITUTIONAL NEGATIVE: 0
NEUROLOGICAL NEGATIVE: 0
CARDIOVASCULAR NEGATIVE: 0
EYES NEGATIVE: 0
PSYCHIATRIC NEGATIVE: 0
RESPIRATORY NEGATIVE: 0
HEMATOLOGIC/LYMPHATIC NEGATIVE: 0

## 2024-07-18 ASSESSMENT — PAIN SCALES - GENERAL: PAINLEVEL: 0-NO PAIN

## 2024-07-18 NOTE — PATIENT INSTRUCTIONS
Thanks for coming in today for follow-up.    Review the information about menopause and ways to manage hot flashes.    Return to the office for a vulvar biopsy.    Review the information of vulvovaginal care and lubricants.    Follow-up with your PCP and other healthcare specialist as needed.

## 2024-07-19 NOTE — PROGRESS NOTES
"58-year-old obese -0-2-3 postmenopausal -American woman presents today with a 1 week history of a slightly tender and slowly enlarging labial \"bump \".      She denies any recent shaving, trauma to the area or spontaneous drainage.  She states that the area Is somewhat tender when she presses on it or if she sitting or wearing restrictive clothing.    The patient also reports menopause symptoms with hot flashes, night sweats, vaginal dryness and discomfort with intercourse as well as decreased libido.    GynHx: Menarche began at age 12.  She is in postmenopausal since age [age].  She reports a history of \"warts as a teenager \".  She had an HPV positive Pap in the past.  She had a hysterectomy for fibroids.  She is sexually active with 1 male partner and reports some dryness.    OBHx:  x3. EAB x2    Subjective   Patient ID: Prema Castañeda is a 58 y.o. female who presents for Bump in vaginal area.  HPI    Review of Systems    Objective   Physical Exam  HENT:      Head: Normocephalic.   Genitourinary:         Comments: 1 cm sub Q, firm, ?sebaceous cyst - no erythema, only slightly tender with firm manipulation, slightly mobile.   2-3 mm grey, NT, ?condylomatous lesions near the posterior forchette   Skin:     General: Skin is warm and dry.   Neurological:      General: No focal deficit present.      Mental Status: She is alert.       A/P: Vulvar likely sebaceous cyst - NT no evidence of infection    -  Warm compresses     -  Reassurance     -  RTC for F/U     Posterior forchette lesions     -  RTC for bx     Menopause sx    -  RTC to discuss     -  Info given     Vaginal dryness     -  Lube samples     -  vulvar care info     -  RTC for follow up discussion  "

## 2024-07-22 ENCOUNTER — APPOINTMENT (OUTPATIENT)
Dept: INTEGRATIVE MEDICINE | Facility: CLINIC | Age: 59
End: 2024-07-22
Payer: COMMERCIAL

## 2024-07-26 ENCOUNTER — APPOINTMENT (OUTPATIENT)
Dept: OBSTETRICS AND GYNECOLOGY | Facility: CLINIC | Age: 59
End: 2024-07-26
Payer: COMMERCIAL

## 2024-07-29 ENCOUNTER — OFFICE VISIT (OUTPATIENT)
Dept: OBSTETRICS AND GYNECOLOGY | Facility: CLINIC | Age: 59
End: 2024-07-29
Payer: COMMERCIAL

## 2024-07-29 ENCOUNTER — TELEPHONE (OUTPATIENT)
Dept: OBSTETRICS AND GYNECOLOGY | Facility: CLINIC | Age: 59
End: 2024-07-29

## 2024-07-29 VITALS
HEIGHT: 61 IN | SYSTOLIC BLOOD PRESSURE: 150 MMHG | WEIGHT: 163 LBS | BODY MASS INDEX: 30.78 KG/M2 | DIASTOLIC BLOOD PRESSURE: 90 MMHG

## 2024-07-29 DIAGNOSIS — L72.3 SEBACEOUS CYST: Primary | ICD-10-CM

## 2024-07-29 DIAGNOSIS — N76.2 ACUTE VULVITIS: ICD-10-CM

## 2024-07-29 PROCEDURE — 3080F DIAST BP >= 90 MM HG: CPT | Performed by: OBSTETRICS & GYNECOLOGY

## 2024-07-29 PROCEDURE — 3077F SYST BP >= 140 MM HG: CPT | Performed by: OBSTETRICS & GYNECOLOGY

## 2024-07-29 PROCEDURE — 99213 OFFICE O/P EST LOW 20 MIN: CPT | Performed by: OBSTETRICS & GYNECOLOGY

## 2024-07-29 PROCEDURE — 1036F TOBACCO NON-USER: CPT | Performed by: OBSTETRICS & GYNECOLOGY

## 2024-07-29 PROCEDURE — 3008F BODY MASS INDEX DOCD: CPT | Performed by: OBSTETRICS & GYNECOLOGY

## 2024-07-29 RX ORDER — SULFAMETHOXAZOLE AND TRIMETHOPRIM 800; 160 MG/1; MG/1
1 TABLET ORAL 2 TIMES DAILY
Qty: 10 TABLET | Refills: 0 | Status: SHIPPED | OUTPATIENT
Start: 2024-07-29 | End: 2024-08-03

## 2024-07-29 ASSESSMENT — ENCOUNTER SYMPTOMS
HEMATURIA: 0
BACK PAIN: 0
APPETITE CHANGE: 0
BLOOD IN STOOL: 0
UNEXPECTED WEIGHT CHANGE: 0
ABDOMINAL DISTENTION: 0
DYSURIA: 0
SLEEP DISTURBANCE: 0
CONSTIPATION: 0
DIARRHEA: 0
FEVER: 0
FLANK PAIN: 0
VOMITING: 0
FATIGUE: 0
CHILLS: 0
FREQUENCY: 0
SHORTNESS OF BREATH: 0
ABDOMINAL PAIN: 0
COLOR CHANGE: 0
NAUSEA: 0

## 2024-07-29 ASSESSMENT — PAIN SCALES - GENERAL: PAINLEVEL: 7

## 2024-07-29 NOTE — TELEPHONE ENCOUNTER
Pt states she has a boil on left vaginal lip. She states she was seen on the 7/12/24 and was advised to sit in warm water twice a day for 15 minutes. Pt states the boil has become worse and more painful despite her attempts to do what is suggested.  Pt scheduled for 4 pm to with Dr. Gloria at [facility].

## 2024-07-29 NOTE — PROGRESS NOTES
"Prema Castañeda is a 58 y.o.  here for lump in vulvar area.    Pt reports lump in left vulvar area x 3 weeks.  Has been using warm compresses and warm soaks without relief.   No bleeding or drainage.   Area is becoming tender and has limited her clothing choices d/t discomfort.      Past medical and surgical history reviewed.     Obstetric History  OB History    Para Term  AB Living   5       2 3   SAB IAB Ectopic Multiple Live Births   2              # Outcome Date GA Lbr Reyes/2nd Weight Sex Type Anes PTL Lv   5             4             3             2 SAB            1 SAB                 Past Medical History  She has a past medical history of Anesthesia of skin, Anxiety disorder, unspecified, Localized enlarged lymph nodes (2019), Myalgia, unspecified site, Other conditions influencing health status (2015), Personal history of other diseases of the circulatory system, Personal history of other mental and behavioral disorders, Personal history of other specified conditions, and Pure hypercholesterolemia, unspecified.    Surgical History  She has a past surgical history that includes Tubal ligation (01/10/2014) and Hysterectomy (01/10/2014).     Social History  She reports that she has quit smoking. Her smoking use included cigarettes. She has never used smokeless tobacco. She reports current alcohol use. She reports that she does not currently use drugs.    Family History  No family history on file.      /90 (BP Location: Left arm, Patient Position: Sitting)   Ht 1.549 m (5' 1\")   Wt 73.9 kg (163 lb)   BMI 30.80 kg/m²   No LMP recorded. Patient has had a hysterectomy.    Review of Systems   Constitutional:  Negative for appetite change, chills, fatigue, fever and unexpected weight change.   Respiratory:  Negative for shortness of breath.    Cardiovascular:  Negative for chest pain.   Gastrointestinal:  Negative for abdominal distention, abdominal pain, " blood in stool, constipation, diarrhea, nausea and vomiting.   Endocrine: Negative for cold intolerance and heat intolerance.   Genitourinary:  Positive for genital sores (lump in vulvar area). Negative for dyspareunia, dysuria, flank pain, frequency, hematuria, menstrual problem, pelvic pain, urgency, vaginal bleeding, vaginal discharge and vaginal pain.   Musculoskeletal:  Negative for back pain.   Skin:  Negative for color change.   Psychiatric/Behavioral:  Negative for sleep disturbance.        Physical Exam  Constitutional:       Appearance: Normal appearance.   Genitourinary:         Comments: 1 cm erythematous swelling just underneath the skin, adjacent to left labia majora  Small skin tag at posteior forchette  Skin:     General: Skin is warm and dry.   Neurological:      Mental Status: She is alert.   Psychiatric:         Mood and Affect: Mood normal.         Behavior: Behavior normal.           Assessment and Plan:    Sebaceous cyst, likely infected  Start Bactrim BID x 5 days  Cont warm compresses    Return for annual visit or as needed

## 2024-08-12 ENCOUNTER — APPOINTMENT (OUTPATIENT)
Dept: OBSTETRICS AND GYNECOLOGY | Facility: CLINIC | Age: 59
End: 2024-08-12
Payer: COMMERCIAL

## 2024-08-19 ENCOUNTER — OFFICE VISIT (OUTPATIENT)
Dept: OBSTETRICS AND GYNECOLOGY | Facility: CLINIC | Age: 59
End: 2024-08-19
Payer: COMMERCIAL

## 2024-08-19 VITALS
SYSTOLIC BLOOD PRESSURE: 145 MMHG | DIASTOLIC BLOOD PRESSURE: 94 MMHG | BODY MASS INDEX: 30.21 KG/M2 | WEIGHT: 160 LBS | HEIGHT: 61 IN

## 2024-08-19 DIAGNOSIS — Z12.4 CERVICAL CANCER SCREENING: Primary | ICD-10-CM

## 2024-08-19 PROCEDURE — 88141 CYTOPATH C/V INTERPRET: CPT | Performed by: PATHOLOGY

## 2024-08-19 PROCEDURE — 88175 CYTOPATH C/V AUTO FLUID REDO: CPT

## 2024-08-19 PROCEDURE — 87624 HPV HI-RISK TYP POOLED RSLT: CPT

## 2024-08-19 PROCEDURE — 3008F BODY MASS INDEX DOCD: CPT | Performed by: OBSTETRICS & GYNECOLOGY

## 2024-08-19 PROCEDURE — 3080F DIAST BP >= 90 MM HG: CPT | Performed by: OBSTETRICS & GYNECOLOGY

## 2024-08-19 PROCEDURE — 3077F SYST BP >= 140 MM HG: CPT | Performed by: OBSTETRICS & GYNECOLOGY

## 2024-08-19 PROCEDURE — 99213 OFFICE O/P EST LOW 20 MIN: CPT | Performed by: OBSTETRICS & GYNECOLOGY

## 2024-08-19 ASSESSMENT — ENCOUNTER SYMPTOMS
CONSTIPATION: 0
BACK PAIN: 0
UNEXPECTED WEIGHT CHANGE: 0
SLEEP DISTURBANCE: 0
ABDOMINAL DISTENTION: 0
SHORTNESS OF BREATH: 0
VOMITING: 0
HEMATURIA: 0
FLANK PAIN: 0
CHILLS: 0
FREQUENCY: 0
BLOOD IN STOOL: 0
FATIGUE: 0
DYSURIA: 0
NAUSEA: 0
FEVER: 0
DIARRHEA: 0
COLOR CHANGE: 0
ABDOMINAL PAIN: 0
APPETITE CHANGE: 0

## 2024-08-19 NOTE — PROGRESS NOTES
"Prema Castañeda is a 58 y.o.  here for pap smear    Pt has h/o hysterectomy. Unsure of last pap. Pt reported history of cervical dysplasia at time of hysterectomy and would like follow up pap smear.     Denies vaginal bleeding or discharge.      Past medical and surgical history reviewed.     Obstetric History  OB History    Para Term  AB Living   5       2 3   SAB IAB Ectopic Multiple Live Births   2              # Outcome Date GA Lbr Reyes/2nd Weight Sex Type Anes PTL Lv   5             4             3             2 SAB            1 SAB                 Past Medical History  She has a past medical history of Anesthesia of skin, Anxiety disorder, unspecified, Localized enlarged lymph nodes (2019), Myalgia, unspecified site, Other conditions influencing health status (2015), Personal history of other diseases of the circulatory system, Personal history of other mental and behavioral disorders, Personal history of other specified conditions, and Pure hypercholesterolemia, unspecified.    Surgical History  She has a past surgical history that includes Tubal ligation (01/10/2014) and Hysterectomy (01/10/2014).     Social History  She reports that she has quit smoking. Her smoking use included cigarettes. She has never used smokeless tobacco. She reports current alcohol use. She reports that she does not currently use drugs.    Family History  No family history on file.      BP (!) 145/94   Ht 1.549 m (5' 1\")   Wt 72.6 kg (160 lb)   BMI 30.23 kg/m²   No LMP recorded. Patient has had a hysterectomy.    Review of Systems   Constitutional:  Negative for appetite change, chills, fatigue, fever and unexpected weight change.   Respiratory:  Negative for shortness of breath.    Cardiovascular:  Negative for chest pain.   Gastrointestinal:  Negative for abdominal distention, abdominal pain, blood in stool, constipation, diarrhea, nausea and vomiting.   Endocrine: Negative " for cold intolerance and heat intolerance.   Genitourinary:  Negative for dyspareunia, dysuria, flank pain, frequency, genital sores, hematuria, menstrual problem, pelvic pain, urgency, vaginal bleeding, vaginal discharge and vaginal pain.   Musculoskeletal:  Negative for back pain.   Skin:  Negative for color change.   Psychiatric/Behavioral:  Negative for sleep disturbance.        Physical Exam  Constitutional:       Appearance: Normal appearance.   Abdominal:      General: Abdomen is flat.      Palpations: Abdomen is soft.      Tenderness: There is no abdominal tenderness.   Genitourinary:     General: Normal vulva.      Vagina: Normal.      Cervix: Normal.      Uterus: Absent.       Comments: Cervix and uterus surgically absent  Vaginal cuff wnl     Skin:     General: Skin is warm and dry.   Neurological:      Mental Status: She is alert.   Psychiatric:         Mood and Affect: Mood normal.         Behavior: Behavior normal.           Assessment and Plan:    Pap of vaginal cuff sent today

## 2024-08-23 ENCOUNTER — TELEPHONE (OUTPATIENT)
Dept: OBSTETRICS AND GYNECOLOGY | Facility: CLINIC | Age: 59
End: 2024-08-23
Payer: COMMERCIAL

## 2024-08-23 NOTE — TELEPHONE ENCOUNTER
patient had a vaginal boil, patient was prescribed antibiotics and the boil downsized a significant amount. Patient completed the antibiotics. The boil has returned. Patient would like an update on what her next steps should be and if its necessary to take another round of antibiotics. [facility] [phone]    patient

## 2024-08-26 ENCOUNTER — APPOINTMENT (OUTPATIENT)
Dept: INTEGRATIVE MEDICINE | Facility: CLINIC | Age: 59
End: 2024-08-26
Payer: COMMERCIAL

## 2024-08-26 DIAGNOSIS — N76.2 ACUTE VULVITIS: Primary | ICD-10-CM

## 2024-08-26 RX ORDER — SULFAMETHOXAZOLE AND TRIMETHOPRIM 800; 160 MG/1; MG/1
1 TABLET ORAL 2 TIMES DAILY
Qty: 10 TABLET | Refills: 0 | Status: SHIPPED | OUTPATIENT
Start: 2024-08-26 | End: 2024-08-31

## 2024-08-26 NOTE — TELEPHONE ENCOUNTER
Attempted to call patient to inform her of medication sent in and for follow up appointment  Unable to leave  for patient to return call, MyChart message sent.    WILLIAM JohnsonN RN

## 2024-08-28 LAB
CYTOLOGY CMNT CVX/VAG CYTO-IMP: NORMAL
HPV HR 12 DNA GENITAL QL NAA+PROBE: POSITIVE
HPV HR GENOTYPES PNL CVX NAA+PROBE: POSITIVE
HPV16 DNA SPEC QL NAA+PROBE: POSITIVE
HPV18 DNA SPEC QL NAA+PROBE: NEGATIVE
LAB AP HPV GENOTYPE QUESTION: YES
LAB AP HPV HR: NORMAL
LABORATORY COMMENT REPORT: NORMAL
MENSTRUAL HX REPORTED: NORMAL
PATH REPORT.TOTAL CANCER: NORMAL
RESIDENT REVIEW: NORMAL

## 2024-08-29 ENCOUNTER — TELEPHONE (OUTPATIENT)
Dept: OBSTETRICS AND GYNECOLOGY | Facility: CLINIC | Age: 59
End: 2024-08-29
Payer: COMMERCIAL

## 2024-08-29 NOTE — TELEPHONE ENCOUNTER
Attempted to call patient to discuss results  Vm box full, unable to leave message.    LEIDY Johnson RN    ----- Message from Zhane Gloria sent at 8/29/2024  8:52 AM EDT -----  Please call the patient regarding her abnormal result.  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  Recommend follow up with colposcopy. Thank you.

## 2024-08-29 NOTE — TELEPHONE ENCOUNTER
RN placed call to patient to discuss pap results.   Patient identified by name and .  Patient informed her pap results came back with abnormal cells referred to as LGSIL  Patient notified that pap also came back with +HPV  Patient educated that HPV is a virus that is transmitted sexually and can cause cancer and/or genital warts over time.  Patient educated that she will need a colposcopy  Explained to patient that a colposcopy is an in office procedure that starts out like a normal pap but the provider will use a colposcope to get a better view of the abnormal cells on the cervix. The provider will then take a small biopsy to send to the lab to make sure nothing comes back precancerous  Patient has had a colposcopy in the past and denies need for further education  Patient encouraged to take tylenol or ibuprofen about one hour prior to appointment to help with any cramping  Explained to patient that she may experience some spotting for a few days  Patient scheduled for colpo on  at 2:15pm with Dr. Gloria  Encouraged patient to call office with any questions or concerns    LEIDY Johnson RN    ----- Message from Zhane Gloria sent at 2024  8:52 AM EDT -----  Please call the patient regarding her abnormal result.  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  Recommend follow up with colposcopy. Thank you.

## 2024-09-04 ENCOUNTER — APPOINTMENT (OUTPATIENT)
Dept: OBSTETRICS AND GYNECOLOGY | Facility: CLINIC | Age: 59
End: 2024-09-04
Payer: COMMERCIAL

## 2024-09-11 ENCOUNTER — APPOINTMENT (OUTPATIENT)
Dept: OBSTETRICS AND GYNECOLOGY | Facility: CLINIC | Age: 59
End: 2024-09-11
Payer: COMMERCIAL

## 2024-09-16 ENCOUNTER — APPOINTMENT (OUTPATIENT)
Dept: PRIMARY CARE | Facility: CLINIC | Age: 59
End: 2024-09-16
Payer: COMMERCIAL

## 2024-09-20 ENCOUNTER — APPOINTMENT (OUTPATIENT)
Dept: OBSTETRICS AND GYNECOLOGY | Facility: CLINIC | Age: 59
End: 2024-09-20
Payer: COMMERCIAL

## 2024-09-20 VITALS
BODY MASS INDEX: 30.66 KG/M2 | WEIGHT: 162.4 LBS | SYSTOLIC BLOOD PRESSURE: 132 MMHG | DIASTOLIC BLOOD PRESSURE: 86 MMHG | HEIGHT: 61 IN

## 2024-09-20 DIAGNOSIS — N95.1 MENOPAUSAL SYNDROME ON HORMONE REPLACEMENT THERAPY: Primary | ICD-10-CM

## 2024-09-20 DIAGNOSIS — Z78.0 MENOPAUSE: ICD-10-CM

## 2024-09-20 DIAGNOSIS — Z79.890 MENOPAUSAL SYNDROME ON HORMONE REPLACEMENT THERAPY: Primary | ICD-10-CM

## 2024-09-20 PROCEDURE — 3008F BODY MASS INDEX DOCD: CPT | Performed by: NURSE PRACTITIONER

## 2024-09-20 PROCEDURE — 3075F SYST BP GE 130 - 139MM HG: CPT | Performed by: NURSE PRACTITIONER

## 2024-09-20 PROCEDURE — 3079F DIAST BP 80-89 MM HG: CPT | Performed by: NURSE PRACTITIONER

## 2024-09-20 PROCEDURE — 99214 OFFICE O/P EST MOD 30 MIN: CPT | Performed by: NURSE PRACTITIONER

## 2024-09-20 PROCEDURE — 1036F TOBACCO NON-USER: CPT | Performed by: NURSE PRACTITIONER

## 2024-09-20 RX ORDER — ESTRADIOL 0.05 MG/D
1 PATCH TRANSDERMAL
Qty: 4 PATCH | Refills: 11 | Status: SHIPPED | OUTPATIENT
Start: 2024-09-20 | End: 2024-09-20 | Stop reason: SDUPTHER

## 2024-09-20 RX ORDER — ESTRADIOL 0.05 MG/D
1 PATCH TRANSDERMAL WEEKLY
Qty: 4 PATCH | Refills: 11 | Status: SHIPPED | OUTPATIENT
Start: 2024-09-20 | End: 2024-09-20 | Stop reason: ALTCHOICE

## 2024-09-20 RX ORDER — ESTRADIOL 0.05 MG/D
1 PATCH TRANSDERMAL
Qty: 4 PATCH | Refills: 11 | Status: SHIPPED | OUTPATIENT
Start: 2024-09-22 | End: 2024-09-20 | Stop reason: WASHOUT

## 2024-09-20 RX ORDER — ESTRADIOL 0.05 MG/D
1 PATCH TRANSDERMAL WEEKLY
Qty: 4 PATCH | Refills: 11 | Status: SHIPPED | OUTPATIENT
Start: 2024-09-20 | End: 2025-09-20

## 2024-09-20 RX ORDER — ESTRADIOL 0.05 MG/D
1 PATCH TRANSDERMAL
Qty: 4 PATCH | Refills: 11 | Status: SHIPPED | OUTPATIENT
Start: 2024-09-22 | End: 2024-09-20

## 2024-09-20 ASSESSMENT — PATIENT HEALTH QUESTIONNAIRE - PHQ9
SUM OF ALL RESPONSES TO PHQ9 QUESTIONS 1 & 2: 2
10. IF YOU CHECKED OFF ANY PROBLEMS, HOW DIFFICULT HAVE THESE PROBLEMS MADE IT FOR YOU TO DO YOUR WORK, TAKE CARE OF THINGS AT HOME, OR GET ALONG WITH OTHER PEOPLE: SOMEWHAT DIFFICULT
2. FEELING DOWN, DEPRESSED OR HOPELESS: SEVERAL DAYS
1. LITTLE INTEREST OR PLEASURE IN DOING THINGS: SEVERAL DAYS

## 2024-09-20 ASSESSMENT — ENCOUNTER SYMPTOMS
RESPIRATORY NEGATIVE: 0
MUSCULOSKELETAL NEGATIVE: 0
HEMATOLOGIC/LYMPHATIC NEGATIVE: 0
EYES NEGATIVE: 0
CARDIOVASCULAR NEGATIVE: 0
PSYCHIATRIC NEGATIVE: 0
NEUROLOGICAL NEGATIVE: 0
ALLERGIC/IMMUNOLOGIC NEGATIVE: 0
CONSTITUTIONAL NEGATIVE: 0
GASTROINTESTINAL NEGATIVE: 0
ENDOCRINE NEGATIVE: 0

## 2024-09-20 ASSESSMENT — PAIN SCALES - GENERAL: PAINLEVEL: 0-NO PAIN

## 2024-09-20 NOTE — PROGRESS NOTES
Subjective   Patient ID: Prema Castañeda is a 58 y.o. female who presents for Menopause (Low libido) and Depression.  HPI    Menopausal age: unknown  Hysterectomy: d/t fibroids    Any Contraindications to HT: personal h/o breast cancer, estrogen sensitive cancer, dementia, stroke, MI, VTE or inherited high risk for VTE, SCAD: none  h/o congenital heart disease (increased risk of DVT) none  H/o HTN and high cholesterol      HT: none  use of OTC remedies: none  bioidenticals: none    Vaginal bleeding:  none  VMS: yes, occasionally nausea  Sleep difficulties: yes  Mood changes: yes, depressive moods  Joint pain: yes  Brain fog/difficulty concentrating: yes  Dry lips    GSM: yes  are you sexually active: no d/t HSDD  decrease in desire: yes         Fractures: none  occupation:  [employer]  Review of Systems    Objective   Physical Exam    Assessment/Plan   Diagnoses and all orders for this visit:  Menopausal syndrome on hormone replacement therapy  -     estradiol (Climara) 0.05 mg/24 hr patch; Place 1 patch over 7 days on the skin 1 (one) time per week.  Menopause  -     Referral to Ob-Gyn Behavioral Health; Future  Estradiol patch prescribed d/t h/o HTN and elevated lipids  Referral to Dr Vizcarra for mood changed  Follow up with me in 6 weeks       LADAN Lott-CNP 09/20/24 8:29 AM

## 2024-09-20 NOTE — LETTER
September 20, 2024     Hellen Stovall MD  [address] [place] [facility], [address]  [facility] [address]    Patient: Prema Castañeda   YOB: 1965   Date of Visit: 9/20/2024       Dear Dr. Hellen Stovall MD:    Thank you for referring Prema Castañeda to me for evaluation. Below are my notes for this consultation.  If you have questions, please do not hesitate to call me. I look forward to following your patient along with you.       Sincerely,     LADAN Lott-CNP      CC: No Recipients  ______________________________________________________________________________________    Subjective  Patient ID: Prema Castañeda is a 58 y.o. female who presents for Menopause (Low libido) and Depression.  HPI    Menopausal age: unknown  Hysterectomy: d/t fibroids    Any Contraindications to HT: personal h/o breast cancer, estrogen sensitive cancer, dementia, stroke, MI, VTE or inherited high risk for VTE, SCAD: none  h/o congenital heart disease (increased risk of DVT) none  H/o HTN and high cholesterol      HT: none  use of OTC remedies: none  bioidenticals: none    Vaginal bleeding:  none  VMS: yes, occasionally nausea  Sleep difficulties: yes  Mood changes: yes, depressive moods  Joint pain: yes  Brain fog/difficulty concentrating: yes  Dry lips    GSM: yes  are you sexually active: no d/t HSDD  decrease in desire: yes         Fractures: none  occupation:  [employer]  Review of Systems    Objective  Physical Exam    Assessment/Plan  Diagnoses and all orders for this visit:  Menopausal syndrome on hormone replacement therapy  -     estradiol (Climara) 0.05 mg/24 hr patch; Place 1 patch over 7 days on the skin 1 (one) time per week.  Menopause  -     Referral to Ob-Gyn Behavioral Health; Future  Estradiol patch prescribed d/t h/o HTN and elevated lipids  Referral to Dr Vizcarra for mood changed  Follow up with me in 6 weeks       KEVIN Lott 09/20/24 8:29 AM

## 2024-09-22 DIAGNOSIS — I10 ESSENTIAL HYPERTENSION: ICD-10-CM

## 2024-09-23 ENCOUNTER — TELEPHONE (OUTPATIENT)
Dept: OBSTETRICS AND GYNECOLOGY | Facility: CLINIC | Age: 59
End: 2024-09-23
Payer: COMMERCIAL

## 2024-09-23 DIAGNOSIS — I10 ESSENTIAL HYPERTENSION: ICD-10-CM

## 2024-09-23 RX ORDER — ROSUVASTATIN CALCIUM 5 MG/1
TABLET, COATED ORAL
Qty: 90 TABLET | Refills: 1 | Status: SHIPPED | OUTPATIENT
Start: 2024-09-23

## 2024-09-23 RX ORDER — HYDROCHLOROTHIAZIDE 25 MG/1
25 TABLET ORAL DAILY
Qty: 90 TABLET | Refills: 1 | Status: SHIPPED | OUTPATIENT
Start: 2024-09-23

## 2024-09-23 NOTE — TELEPHONE ENCOUNTER
Contacted pt  Name and  verified  Pt aware rx was called into pharmacy today e transmission failed for rx on 2024  Pt verbalized understanding.  No further questions or concerns at this time.

## 2024-09-23 NOTE — TELEPHONE ENCOUNTER
Called [facility]  pharmacy [phone]  Gave verbal rx over phone to fill 1 years supply of climara patch once weekly

## 2024-09-24 ENCOUNTER — TELEPHONE (OUTPATIENT)
Dept: PRIMARY CARE | Facility: CLINIC | Age: 59
End: 2024-09-24
Payer: COMMERCIAL

## 2024-09-24 DIAGNOSIS — M35.9 UNDIFFERENTIATED CONNECTIVE TISSUE DISEASE (MULTI): Primary | ICD-10-CM

## 2024-09-24 RX ORDER — CYCLOBENZAPRINE HCL 10 MG
10 TABLET ORAL NIGHTLY PRN
Qty: 30 TABLET | Refills: 0 | Status: SHIPPED | OUTPATIENT
Start: 2024-09-24 | End: 2024-11-23

## 2024-09-26 ENCOUNTER — TELEMEDICINE (OUTPATIENT)
Dept: PRIMARY CARE | Facility: CLINIC | Age: 59
End: 2024-09-26
Payer: COMMERCIAL

## 2024-09-26 DIAGNOSIS — M35.9 UNDIFFERENTIATED CONNECTIVE TISSUE DISEASE (MULTI): Primary | ICD-10-CM

## 2024-09-26 PROCEDURE — 99213 OFFICE O/P EST LOW 20 MIN: CPT | Performed by: FAMILY MEDICINE

## 2024-09-26 NOTE — PROGRESS NOTES
Pt is here for issues with previous surg      Chief Complaint:  No chief complaint on file.  History Of Present Illness:   Prema Castañeda is a 58 y.o. female      Prema Castañeda is a 58 y.o. female presenting with a history of HTN, hyperlipidemia and connective tissue disorder and internal hemorrhoids, Jan 2023- surgery for submandibular/parapharyngeal abscesses, who is presenting for a sick visit      Virtual or Telephone Consent    An interactive audio and video telecommunication system which permits real time communications between the patient (at the originating site) and provider (at the distant site) was utilized to provide this telehealth service.   Verbal consent was requested and obtained from Prema Castañeda on this date, 09/26/24 for a telehealth visit.         Lips have been dry and she's been peeling them. They are dark and purple.     She is feeling weak. She was recently given an antibiotic. She took the antibiotics twice. Bactrim antibiotic.     Healthcare team:  - ENT  - dental  - GYN  - rheum- agreed on new referral.        Salivary glands and face swelling- Jan 2023.   Jan 2023- surgeries for left submandibular and parapharyngeal abscess. sent home with PICC line for antibiotics.   Feb 15, 2023 update: Was having some symptoms with antibiotics and just went back on antibiotics last week. Muscles hurting, potassium levels were off.   March 8, 2023- will have salivary gland stone removal surgery planned.   Nov 2023 update- some taste changes with spicy foods, used numbing mouth wash that is helpful.   May 2024 update: checked with ENT in Dec 2023, also saw Dentist. She still feels on the left side of her chin area, she feels pain and it feels like stiffness and she still feels something in her left cheek.      Pain in right hand. Thumb. 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 Right 1st CMC joint.             HTN- she's been taking her hctz. Pt has declined adding medications  previously. Her blood pressure has been elevated, and she did not take her medications today. Nov 2023 update: agreed to continue on amlodipine 2.5mg. May 2024- elevated. Agreed to inc amlodipine to 5mg.      Cholesterol- she's not taking her medication. Some more body aches. Feb 2022 update: not taking crestor now. she is taking zetia. Feb 24, 2022- restarted crestor. Nov 2023 update: off and on with her taking crestor. We agreed she could at least try it every other day. May 2024- refilling crestor.         Rheum-she will not have a rheumatology visit until January 2021. She states that she is having shoulder pain, which was not improved after a January 2020 subacromial bursa injection here in the office. She is also having vague right elbow pain and other aches and pains that may be related to her mixed connective tissue disorder. May 13, 2021 update: was having pain recently and she went to [facility] for rehab. put back on hydroxychloroquine. Feb 2022 update- back on hydroxychloroquine. March 2022- last visit, still on hydroxychloroquine. Feb 2023 update: still taking hydroxychloroquine. May 2024 update: not on hydroxychloroquine, wondering about most recent test results.         SH: rare cig smoking. stopped etoh. no idu.      Flu shot: declines flu shot     Mammogram: Oct 2020.      COVID: never tested positive. unlikely to take vaccine.       Previous visits:    Feb 22, 2022- Right sided chest pain- moved to her right shoulder blade. started around 315pm and lasted all throughout the night. Used heating pad and nebulizer. Sleeping upright was more helpful. Now it is a dull pain. Not coughing as much today. Exertion does not effect chest pain.   Some chronic left jaw numbness.   Sept 2022 update: none recently.        Feb 16, 2022- Chest congestion and cough. Since last Wed. Wanted to make sure lungs and heart were ok. Granddaughter came home from school Tue with a cough. Then the next day she started  getting sick. Cough: all throughout the day, not horrible. Coughing up phlegm and mucus. Some in chest. Was taking nyquil and vitamins. No concerns for COVID.   Sept 2022 update: good now.   Nov 2023 update: had a cold about a month ago, but she's been coughing since then. Sometimes productive, sometimes dry. We discussed saline rinses.        PSH: left cheek surgery Jan 2023.               SH: house, daughter and 2 grandchildren.        Tobacco: states she was never a smoker, but admits she was around tobacco smoke.       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      Flu shot:  - declines     Immunizations:   - utd per pt.     Colonoscopy: 2016.                       Review of Systems:     Negative  Constitutional:   - fever   - chills   - night sweats  - unexpected weight change  - changes in sleep     Eyes:   - loss of vision  - double vision  - floaters          Cardiovascular:   - chest pain  - chest heaviness  - palpitations  - swelling in ankles       Respiratory:   - shortness of breath  - difficulty breathing  - frequent cough  - wheezing     Neurological:   - loss of consciousness  - tremors  - dizzy spells  - numbness   - tingling     Gastrointestinal:   - abdominal pain  - nausea  - vomiting  - constipation  - diarrhea  - bloody stools  - loss of bowel control  - indigestion  - heartburn  - sour taste in throat when waking up     Genitourinary:   - urinary incontinence  - increased urinary frequency  - painful urination  - blood in urine     Skin:   - Rash  - lumps or bumps  - worrisome moles     Endocrine:   - excessive thirst  - feeling too hot  - feeling too cold  - feeling tired  - fatigue      Hematologic/Lymphatic:   - swollen glands  - blood clotting problems  - easy bruising.     Psychological:   - feelings of depression  - feelings of anxiety.     Sexual:   - sexual health concerns        Positive  Psychological:   - feeling generally happy  - feeling safe at home            Last  Recorded Vitals:  There were no vitals filed for this visit.       Past Medical History:  She has a past medical history of Anesthesia of skin, Anxiety disorder, unspecified, Localized enlarged lymph nodes (01/17/2019), Myalgia, unspecified site, Other conditions influencing health status (03/31/2015), Personal history of other diseases of the circulatory system, Personal history of other mental and behavioral disorders, Personal history of other specified conditions, and Pure hypercholesterolemia, unspecified.     Past Surgical History:  She has a past surgical history that includes Tubal ligation (01/10/2014) and Hysterectomy (01/10/2014).     Social History:  She reports that she has quit smoking. Her smoking use included cigarettes. She has never used smokeless tobacco. She reports current alcohol use. She reports that she does not currently use drugs.     Family History:  No family history on file.  Allergies:  Latex and Penicillins     Outpatient Medications:  Current Outpatient Medications   Medication Instructions    albuterol 90 mcg/actuation inhaler 2 puffs, inhalation, Every 4 hours PRN    amLODIPine (NORVASC) 10 mg, oral, Daily    ascorbic acid (Vitamin C) 250 MG chewable tablet 1 tablet, oral, Daily    b complex 0.4 mg tablet 1 tablet, oral, Daily    BACILLUS COAGULANS-INULIN ORAL oral    cholecalciferol (Vitamin D-3) 25 MCG (1000 UT) tablet 1 tablet, oral, Daily    coenzyme Q-10 100 mg capsule oral    cyclobenzaprine (FLEXERIL) 10 mg, oral, Nightly PRN    estradiol (Climara) 0.05 mg/24 hr patch 1 patch, transdermal, Weekly    ezetimibe (Zetia) 10 mg tablet 1 tablet, oral, Daily    hydroCHLOROthiazide (HYDRODIURIL) 25 mg, oral, Daily, as directed    milk thistle 175 mg, oral, Daily    omega-3 fatty acids-fish oil (One-Per-Day Omega-3) 684-1,200 mg capsule oral    rosuvastatin (Crestor) 5 mg tablet TAKE 1 TABLET BY MOUTH EVERY DAY    zinc gluconate 50 mg tablet oral, Daily        Physical Exam:  GENERAL:  Well developed, well nourished, alert and cooperative, and appears to be in no acute distress.     PSYCH: mood pleasant and appropriate                Last Labs:  CBC -  Lab Results   Component Value Date    WBC 8.0 11/22/2023    HGB 13.3 11/22/2023    HCT 39.2 11/22/2023    MCV 97 11/22/2023     11/22/2023        CMP -  Lab Results   Component Value Date    CALCIUM 10.1 11/22/2023    PHOS 3.5 01/17/2023    PROT 8.3 (H) 11/22/2023    ALBUMIN 4.2 11/22/2023    AST 20 11/22/2023    ALT 13 11/22/2023    ALKPHOS 59 11/22/2023    BILITOT 0.4 11/22/2023        LIPID PANEL -  Lab Results   Component Value Date    CHOL 242 (H) 02/15/2023    TRIG 135 02/15/2023    HDL 54.3 02/15/2023    CHHDL 4.5 02/15/2023    LDLF 161 (H) 02/15/2023    VLDL 27 02/15/2023           Lab Results   Component Value Date    BNP 12 11/22/2023    HGBA1C 5.4 10/14/2020             1. Undifferentiated connective tissue disease (Multi)             Make your ENT and rheumatology referral.    Your lips could be irritated, agreed on aquaphor topically.     Follow up 4 months for blood pressure.         Carmelo Lopez DO

## 2024-09-27 ENCOUNTER — LAB (OUTPATIENT)
Dept: LAB | Facility: LAB | Age: 59
End: 2024-09-27
Payer: COMMERCIAL

## 2024-09-27 DIAGNOSIS — R53.82 CHRONIC FATIGUE: ICD-10-CM

## 2024-09-27 DIAGNOSIS — E55.9 VITAMIN D DEFICIENCY: ICD-10-CM

## 2024-09-27 DIAGNOSIS — M35.9 UNDIFFERENTIATED CONNECTIVE TISSUE DISEASE (MULTI): ICD-10-CM

## 2024-09-27 DIAGNOSIS — I10 ESSENTIAL HYPERTENSION: ICD-10-CM

## 2024-09-27 LAB
ERYTHROCYTE [DISTWIDTH] IN BLOOD BY AUTOMATED COUNT: 13.2 % (ref 11.5–14.5)
ERYTHROCYTE [SEDIMENTATION RATE] IN BLOOD BY WESTERGREN METHOD: 71 MM/H (ref 0–30)
HCT VFR BLD AUTO: 35.9 % (ref 36–46)
HGB BLD-MCNC: 12.2 G/DL (ref 12–16)
MCH RBC QN AUTO: 32.3 PG (ref 26–34)
MCHC RBC AUTO-ENTMCNC: 34 G/DL (ref 32–36)
MCV RBC AUTO: 95 FL (ref 80–100)
NRBC BLD-RTO: 0 /100 WBCS (ref 0–0)
PLATELET # BLD AUTO: 276 X10*3/UL (ref 150–450)
RBC # BLD AUTO: 3.78 X10*6/UL (ref 4–5.2)
WBC # BLD AUTO: 13.7 X10*3/UL (ref 4.4–11.3)

## 2024-09-27 PROCEDURE — 80053 COMPREHEN METABOLIC PANEL: CPT

## 2024-09-27 PROCEDURE — 36415 COLL VENOUS BLD VENIPUNCTURE: CPT

## 2024-09-27 PROCEDURE — 85652 RBC SED RATE AUTOMATED: CPT

## 2024-09-27 PROCEDURE — 85027 COMPLETE CBC AUTOMATED: CPT

## 2024-09-27 PROCEDURE — 82306 VITAMIN D 25 HYDROXY: CPT

## 2024-09-27 PROCEDURE — 82607 VITAMIN B-12: CPT

## 2024-09-27 PROCEDURE — 86140 C-REACTIVE PROTEIN: CPT

## 2024-09-27 PROCEDURE — 80061 LIPID PANEL: CPT

## 2024-09-28 LAB
25(OH)D3 SERPL-MCNC: 47 NG/ML (ref 30–100)
ALBUMIN SERPL BCP-MCNC: 3.8 G/DL (ref 3.4–5)
ALP SERPL-CCNC: 52 U/L (ref 33–110)
ALT SERPL W P-5'-P-CCNC: 28 U/L (ref 7–45)
ANION GAP SERPL CALC-SCNC: 15 MMOL/L (ref 10–20)
AST SERPL W P-5'-P-CCNC: 24 U/L (ref 9–39)
BILIRUB SERPL-MCNC: 0.5 MG/DL (ref 0–1.2)
BUN SERPL-MCNC: 13 MG/DL (ref 6–23)
CALCIUM SERPL-MCNC: 10.1 MG/DL (ref 8.6–10.6)
CHLORIDE SERPL-SCNC: 101 MMOL/L (ref 98–107)
CHOLEST SERPL-MCNC: 203 MG/DL (ref 0–199)
CHOLESTEROL/HDL RATIO: 5.5
CO2 SERPL-SCNC: 30 MMOL/L (ref 21–32)
CREAT SERPL-MCNC: 0.81 MG/DL (ref 0.5–1.05)
CRP SERPL-MCNC: 8.71 MG/DL
EGFRCR SERPLBLD CKD-EPI 2021: 84 ML/MIN/1.73M*2
GLUCOSE SERPL-MCNC: 93 MG/DL (ref 74–99)
HDLC SERPL-MCNC: 37.1 MG/DL
LDLC SERPL CALC-MCNC: 120 MG/DL
NON HDL CHOLESTEROL: 166 MG/DL (ref 0–149)
POTASSIUM SERPL-SCNC: 3 MMOL/L (ref 3.5–5.3)
PROT SERPL-MCNC: 7.5 G/DL (ref 6.4–8.2)
SODIUM SERPL-SCNC: 143 MMOL/L (ref 136–145)
TRIGL SERPL-MCNC: 232 MG/DL (ref 0–149)
VIT B12 SERPL-MCNC: 1400 PG/ML (ref 211–911)
VLDL: 46 MG/DL (ref 0–40)

## 2024-09-30 ENCOUNTER — TELEPHONE (OUTPATIENT)
Dept: PRIMARY CARE | Facility: CLINIC | Age: 59
End: 2024-09-30

## 2024-09-30 ENCOUNTER — APPOINTMENT (OUTPATIENT)
Dept: OBSTETRICS AND GYNECOLOGY | Facility: CLINIC | Age: 59
End: 2024-09-30
Payer: COMMERCIAL

## 2024-09-30 DIAGNOSIS — K13.70 LESION OF MOUTH: Primary | ICD-10-CM

## 2024-10-01 ENCOUNTER — OFFICE VISIT (OUTPATIENT)
Dept: OTOLARYNGOLOGY | Facility: CLINIC | Age: 59
End: 2024-10-01
Payer: COMMERCIAL

## 2024-10-01 VITALS — TEMPERATURE: 97.3 F | BODY MASS INDEX: 29.45 KG/M2 | HEIGHT: 61 IN | WEIGHT: 156 LBS

## 2024-10-01 DIAGNOSIS — K11.20 SIALADENITIS: Primary | ICD-10-CM

## 2024-10-01 DIAGNOSIS — R07.0 THROAT DISCOMFORT: ICD-10-CM

## 2024-10-01 PROCEDURE — 3008F BODY MASS INDEX DOCD: CPT

## 2024-10-01 PROCEDURE — 1036F TOBACCO NON-USER: CPT

## 2024-10-01 PROCEDURE — 99214 OFFICE O/P EST MOD 30 MIN: CPT

## 2024-10-01 RX ORDER — CEPHALEXIN 500 MG/1
500 CAPSULE ORAL 3 TIMES DAILY
Qty: 21 CAPSULE | Refills: 0 | Status: SHIPPED | OUTPATIENT
Start: 2024-10-01 | End: 2024-10-08

## 2024-10-01 RX ORDER — METHYLPREDNISOLONE 4 MG/1
TABLET ORAL
Qty: 21 TABLET | Refills: 0 | Status: SHIPPED | OUTPATIENT
Start: 2024-10-01 | End: 2024-10-08

## 2024-10-01 RX ORDER — CLINDAMYCIN HYDROCHLORIDE 300 MG/1
300 CAPSULE ORAL 3 TIMES DAILY
Qty: 30 CAPSULE | Refills: 0 | Status: SHIPPED | OUTPATIENT
Start: 2024-10-01 | End: 2024-10-01 | Stop reason: ALTCHOICE

## 2024-10-01 ASSESSMENT — PATIENT HEALTH QUESTIONNAIRE - PHQ9
SUM OF ALL RESPONSES TO PHQ9 QUESTIONS 1 AND 2: 2
1. LITTLE INTEREST OR PLEASURE IN DOING THINGS: SEVERAL DAYS
10. IF YOU CHECKED OFF ANY PROBLEMS, HOW DIFFICULT HAVE THESE PROBLEMS MADE IT FOR YOU TO DO YOUR WORK, TAKE CARE OF THINGS AT HOME, OR GET ALONG WITH OTHER PEOPLE: SOMEWHAT DIFFICULT
2. FEELING DOWN, DEPRESSED OR HOPELESS: SEVERAL DAYS

## 2024-10-01 NOTE — PROGRESS NOTES
Reason For Consult  Chief Complaint   Patient presents with    New Patient Visit     Complications with Soco gland   Had surgery last year        HISTORY OF PRESENT ILLNESS:  Prema Castañeda, who is a 58 y.o. female presenting for an initial visit for swelling in left submandibular region and swelling under left sublingual region..  The patient reports that she had surgery in January of 2023 for drainage of salivary gland and parapharyngeal abscess. The patient was switched from clindamycin to Keflex at previous surgery date. The patient tolerated Keflex without any reactions or side effects. The patient reports that the swelling has been occurring over the past couple weeks. She has been performing light massage to the area and reports feelings of tightness in the submandibular region.  The patient denies any shortness of breath. The patient denies any issues with swallowing solids, liquids, or pills. The patient denies any voice changes. No current smoking hx.     Past Medical History  She has a past medical history of Anesthesia of skin, Anxiety disorder, unspecified, Localized enlarged lymph nodes (01/17/2019), Myalgia, unspecified site, Other conditions influencing health status (03/31/2015), Personal history of other diseases of the circulatory system, Personal history of other mental and behavioral disorders, Personal history of other specified conditions, and Pure hypercholesterolemia, unspecified.  Surgical History  She has a past surgical history that includes Tubal ligation (01/10/2014) and Hysterectomy (01/10/2014).     Social History  She reports that she has quit smoking. Her smoking use included cigarettes. She has never been exposed to tobacco smoke. She has never used smokeless tobacco. She reports current alcohol use. She reports that she does not currently use drugs.    Allergies  Sulfa (sulfonamide antibiotics), Latex, and Penicillins    Review of Systems  All 10 systems were reviewed and  "negative except for above.      Physical Exam  CONSTITUTIONAL: Well developed, well nourished.    VOICE:   RESPIRATION: Breathing comfortably, no stridor.    NEURO: Alert and oriented x3, cranial nerves II-XII intact and symmetric bilaterally.    EARS: Normal external ears, external auditory canals, normal hearing to conversational voice.    NOSE: External nose midline, anterior rhinoscopy is normal with limited visualization to the anterior aspect of the interior turbinates. No lesions noted.     ORAL CAVITY/OROPHARYNX/LIPS: Normal mucous membranes, normal floor of mouth/tongue/OP, no masses or lesions are noted.    SKIN: Neck skin is normal   PSYCH: Alert and oriented with appropriate mood and affect.        Last Recorded Vitals  Temperature 36.3 °C (97.3 °F), height 1.549 m (5' 1\"), weight 70.8 kg (156 lb).    ASSESSMENT AND PLAN:   This is an initial visit for Sialadenitis with clinical findings notable for swelling in submandibular region with minor edema under the tongue. Discussed use of medrol dosepak and Keflex. Patient to follow up in 2 weeks. If symptoms not improving in 2 weeks discussed U/S of neck. If patient has any issues with medication, educated to call office to let me know for adjustments. Patient agreeable to plan. All questions answered.     We discussed the treatment options to include, medical and surgical options.  We have decided to proceed as follows:   Medrol dosepak ordered   Keflex 500mg TID for 7 days. Patient previously tolerated.    Use of sour candies.   Salt water gargles for next 1-2 weeks. Can then switch to seltzer water/carbonated water gargles for mucus management  Can use Biotene mouth spray for dry mouth symptoms.   Follow up in-office in 2 weeks.        "

## 2024-10-15 ENCOUNTER — APPOINTMENT (OUTPATIENT)
Dept: OTOLARYNGOLOGY | Facility: CLINIC | Age: 59
End: 2024-10-15
Payer: COMMERCIAL

## 2024-10-16 ENCOUNTER — APPOINTMENT (OUTPATIENT)
Dept: OBSTETRICS AND GYNECOLOGY | Facility: CLINIC | Age: 59
End: 2024-10-16
Payer: COMMERCIAL

## 2024-10-28 ENCOUNTER — APPOINTMENT (OUTPATIENT)
Dept: RHEUMATOLOGY | Facility: CLINIC | Age: 59
End: 2024-10-28
Payer: COMMERCIAL

## 2024-10-28 VITALS
HEIGHT: 61 IN | SYSTOLIC BLOOD PRESSURE: 133 MMHG | BODY MASS INDEX: 30.21 KG/M2 | HEART RATE: 89 BPM | OXYGEN SATURATION: 99 % | WEIGHT: 160 LBS | DIASTOLIC BLOOD PRESSURE: 89 MMHG

## 2024-10-28 DIAGNOSIS — M54.41 CHRONIC BILATERAL LOW BACK PAIN WITH BILATERAL SCIATICA: Primary | ICD-10-CM

## 2024-10-28 DIAGNOSIS — M35.9 UNDIFFERENTIATED CONNECTIVE TISSUE DISEASE (MULTI): ICD-10-CM

## 2024-10-28 DIAGNOSIS — M47.817 SPONDYLOSIS OF LUMBOSACRAL REGION, UNSPECIFIED SPINAL OSTEOARTHRITIS COMPLICATION STATUS: ICD-10-CM

## 2024-10-28 DIAGNOSIS — M54.42 CHRONIC BILATERAL LOW BACK PAIN WITH BILATERAL SCIATICA: Primary | ICD-10-CM

## 2024-10-28 DIAGNOSIS — G89.29 CHRONIC BILATERAL LOW BACK PAIN WITH BILATERAL SCIATICA: Primary | ICD-10-CM

## 2024-10-28 DIAGNOSIS — M18.11 PRIMARY OSTEOARTHRITIS OF FIRST CARPOMETACARPAL JOINT OF RIGHT HAND: ICD-10-CM

## 2024-10-28 PROCEDURE — 3079F DIAST BP 80-89 MM HG: CPT | Performed by: INTERNAL MEDICINE

## 2024-10-28 PROCEDURE — 3075F SYST BP GE 130 - 139MM HG: CPT | Performed by: INTERNAL MEDICINE

## 2024-10-28 PROCEDURE — 3008F BODY MASS INDEX DOCD: CPT | Performed by: INTERNAL MEDICINE

## 2024-10-28 PROCEDURE — 99214 OFFICE O/P EST MOD 30 MIN: CPT | Performed by: INTERNAL MEDICINE

## 2024-10-28 ASSESSMENT — PAIN SCALES - GENERAL: PAINLEVEL_OUTOF10: 5

## 2024-10-29 ENCOUNTER — APPOINTMENT (OUTPATIENT)
Dept: BEHAVIORAL HEALTH | Facility: CLINIC | Age: 59
End: 2024-10-29
Payer: COMMERCIAL

## 2024-10-29 DIAGNOSIS — Z78.0 MENOPAUSE: ICD-10-CM

## 2024-10-29 DIAGNOSIS — F32.9 MAJOR DEPRESSION, CHRONIC: Primary | ICD-10-CM

## 2024-10-29 PROCEDURE — 90791 PSYCH DIAGNOSTIC EVALUATION: CPT | Performed by: PSYCHOLOGIST

## 2024-10-31 ENCOUNTER — APPOINTMENT (OUTPATIENT)
Dept: OBSTETRICS AND GYNECOLOGY | Facility: CLINIC | Age: 59
End: 2024-10-31
Payer: COMMERCIAL

## 2024-10-31 VITALS
BODY MASS INDEX: 30.21 KG/M2 | SYSTOLIC BLOOD PRESSURE: 130 MMHG | WEIGHT: 160 LBS | DIASTOLIC BLOOD PRESSURE: 80 MMHG | HEIGHT: 61 IN

## 2024-10-31 DIAGNOSIS — R87.629 ABNORMAL PAP SMEAR OF VAGINA: Primary | ICD-10-CM

## 2024-10-31 PROCEDURE — 57420 EXAM OF VAGINA W/SCOPE: CPT | Performed by: OBSTETRICS & GYNECOLOGY

## 2024-10-31 ASSESSMENT — PAIN SCALES - GENERAL: PAINLEVEL_OUTOF10: 0-NO PAIN

## 2024-11-05 ENCOUNTER — APPOINTMENT (OUTPATIENT)
Dept: OBSTETRICS AND GYNECOLOGY | Facility: CLINIC | Age: 59
End: 2024-11-05
Payer: COMMERCIAL

## 2024-11-06 ENCOUNTER — APPOINTMENT (OUTPATIENT)
Dept: OBSTETRICS AND GYNECOLOGY | Facility: CLINIC | Age: 59
End: 2024-11-06
Payer: COMMERCIAL

## 2024-11-06 DIAGNOSIS — Z79.890 MENOPAUSAL SYNDROME ON HORMONE REPLACEMENT THERAPY: Primary | ICD-10-CM

## 2024-11-06 DIAGNOSIS — N95.1 MENOPAUSAL SYNDROME ON HORMONE REPLACEMENT THERAPY: Primary | ICD-10-CM

## 2024-11-06 DIAGNOSIS — R45.86 MOOD CHANGES: ICD-10-CM

## 2024-11-06 PROCEDURE — 99441 PR PHYS/QHP TELEPHONE EVALUATION 5-10 MIN: CPT | Performed by: NURSE PRACTITIONER

## 2024-11-06 RX ORDER — ESTRADIOL 0.1 MG/D
1 PATCH TRANSDERMAL WEEKLY
Qty: 4 PATCH | Refills: 11 | Status: SHIPPED | OUTPATIENT
Start: 2024-11-06 | End: 2025-11-06

## 2024-11-06 NOTE — PROGRESS NOTES
"Subjective   Patient ID: Prema Castañeda is a 59 y.o. female who presents for No chief complaint on file..  HPI  Follow up from 9/2024  Menopausal age: unknown  Hysterectomy: d/t fibroids  VMS: yes, occasionally nausea  Sleep difficulties: yes  Mood changes: yes, depressive moods  Joint pain: yes  Brain fog/difficulty concentrating: yes  Dry lips     GSM: yes  are you sexually active: no d/t HSDD  decrease in desire: yes    I prescribed her 0.05mg estradiol patch, no need for MP d/t hysterectomy  H/o HTN, lipids  Referral to Dr Vizcarra for mood changes    Today she states:  Able to focus and \"do more\"  Sleep: no improvement, feels it may be d/t moods  Moods: \"a lot better\"  VMS: improved  HSDD improved    No AE  States Dr Vizcarra recommended therapy and she would like a referral     Review of Systems    Objective   Physical Exam    Assessment/Plan   Diagnoses and all orders for this visit:  Menopausal syndrome on hormone replacement therapy  -     estradiol (Climara) 0.1 mg/24 hr patch; Place 1 patch over 7 days on the skin 1 (one) time per week.  -     Referral to Psychology; Future    Decision to increase estradiol patch from 0.05mg to 0.1mg  Follow up with me in 6 weeks           LADAN Lott-CNP 11/06/24 11:04 AM   "

## 2024-11-14 ENCOUNTER — APPOINTMENT (OUTPATIENT)
Dept: OTOLARYNGOLOGY | Facility: CLINIC | Age: 59
End: 2024-11-14
Payer: COMMERCIAL

## 2024-11-25 ENCOUNTER — APPOINTMENT (OUTPATIENT)
Dept: OBSTETRICS AND GYNECOLOGY | Facility: CLINIC | Age: 59
End: 2024-11-25
Payer: COMMERCIAL

## 2024-12-18 ENCOUNTER — APPOINTMENT (OUTPATIENT)
Dept: OBSTETRICS AND GYNECOLOGY | Facility: CLINIC | Age: 59
End: 2024-12-18
Payer: COMMERCIAL

## 2024-12-18 DIAGNOSIS — Z79.890 MENOPAUSAL SYNDROME ON HORMONE REPLACEMENT THERAPY: Primary | ICD-10-CM

## 2024-12-18 DIAGNOSIS — N95.1 MENOPAUSAL SYNDROME ON HORMONE REPLACEMENT THERAPY: Primary | ICD-10-CM

## 2024-12-18 PROCEDURE — 99441 PR PHYS/QHP TELEPHONE EVALUATION 5-10 MIN: CPT | Performed by: NURSE PRACTITIONER

## 2024-12-18 NOTE — PROGRESS NOTES
"Subjective   Patient ID: Prema Castañeda is a 59 y.o. female who presents for No chief complaint on file..  HPI  Follow up from 10/2024  Menopausal age: unknown  Hysterectomy: d/t fibroids  I prescribed her 0.05mg estradiol patch, no need for MP d/t hysterectomy  H/o HTN, lipids  Referral to Dr Vizcarra for mood changes     Today she states:  Able to focus and \"do more\"  Sleep: no improvement, feels it may be d/t moods  Moods: \"a lot better\"  VMS: improved  HSDD improved     No AE  States Dr Vizcarra recommended therapy and she would like a referral    Estradiol increased from 0.05mg to 0.1mg  Sleep: no improvement but feels it is d/t stress  Moods: manageable  VMS: \"a lot better\"      Had an appointment with Dr. Vizcarra and was recommended she follow up with a different therapist but has not been able to schedule yet  Review of Systems    Objective   Physical Exam    Assessment/Plan   Diagnoses and all orders for this visit:  Menopausal syndrome on hormone replacement therapy    Will continue with current dose of 0.1mg estradiol patch, no need for progesterone d/t hysterectomy  Follow up annually with me or PRN  I encouraged her to schedule with a therapist       LADAN Lott-CNP 12/18/24 9:08 AM   "

## 2024-12-20 DIAGNOSIS — I10 ESSENTIAL HYPERTENSION: ICD-10-CM

## 2024-12-20 RX ORDER — AMLODIPINE BESYLATE 10 MG/1
10 TABLET ORAL DAILY
Qty: 90 TABLET | Refills: 1 | Status: SHIPPED | OUTPATIENT
Start: 2024-12-20

## 2025-01-06 ASSESSMENT — ENCOUNTER SYMPTOMS
NAUSEA: 0
HEMATURIA: 0
ANOREXIA: 0
HEADACHES: 0
CONSTIPATION: 0
FEVER: 0
FREQUENCY: 0
VOMITING: 0
ABDOMINAL PAIN: 0
DYSURIA: 0
SORE THROAT: 0
DIARRHEA: 0
CHILLS: 0
BACK PAIN: 1
FLANK PAIN: 0

## 2025-01-13 ENCOUNTER — APPOINTMENT (OUTPATIENT)
Dept: OBSTETRICS AND GYNECOLOGY | Facility: CLINIC | Age: 60
End: 2025-01-13
Payer: COMMERCIAL

## 2025-01-13 VITALS
BODY MASS INDEX: 30.96 KG/M2 | DIASTOLIC BLOOD PRESSURE: 97 MMHG | SYSTOLIC BLOOD PRESSURE: 149 MMHG | WEIGHT: 164 LBS | HEIGHT: 61 IN

## 2025-01-13 DIAGNOSIS — N95.1 MENOPAUSAL SYMPTOMS: ICD-10-CM

## 2025-01-13 DIAGNOSIS — N90.0 VIN I (VULVAR INTRAEPITHELIAL NEOPLASIA I): Primary | ICD-10-CM

## 2025-01-13 DIAGNOSIS — Z01.419 WOMEN'S ANNUAL ROUTINE GYNECOLOGICAL EXAMINATION: ICD-10-CM

## 2025-01-13 PROCEDURE — 3008F BODY MASS INDEX DOCD: CPT | Performed by: OBSTETRICS & GYNECOLOGY

## 2025-01-13 PROCEDURE — 1036F TOBACCO NON-USER: CPT | Performed by: OBSTETRICS & GYNECOLOGY

## 2025-01-13 PROCEDURE — 3077F SYST BP >= 140 MM HG: CPT | Performed by: OBSTETRICS & GYNECOLOGY

## 2025-01-13 PROCEDURE — 3080F DIAST BP >= 90 MM HG: CPT | Performed by: OBSTETRICS & GYNECOLOGY

## 2025-01-13 PROCEDURE — 99396 PREV VISIT EST AGE 40-64: CPT | Performed by: OBSTETRICS & GYNECOLOGY

## 2025-01-13 RX ORDER — ESTRADIOL 1 MG/1
1 TABLET ORAL DAILY
Qty: 90 TABLET | Refills: 3 | Status: SHIPPED | OUTPATIENT
Start: 2025-01-13 | End: 2025-01-16 | Stop reason: WASHOUT

## 2025-01-13 ASSESSMENT — PAIN SCALES - GENERAL: PAINLEVEL_OUTOF10: 0-NO PAIN

## 2025-01-13 ASSESSMENT — ENCOUNTER SYMPTOMS
VOMITING: 0
FREQUENCY: 0
HEMATURIA: 0
ABDOMINAL PAIN: 0
BACK PAIN: 1
DYSURIA: 0
FLANK PAIN: 0
SORE THROAT: 0
NAUSEA: 0
HEADACHES: 0
CHILLS: 0
CONSTIPATION: 0
FEVER: 0
DIARRHEA: 0

## 2025-01-13 NOTE — PROGRESS NOTES
Prema Castañeda is a 59 y.o. female who is here for a routine exam. PCP = Carmelo Lopez DO  Patient for annual exam no complaints.  Sexually active with steady partner.  Status post hysterectomy.  Has been seen in the menopause clinic.  Currently is using the patch but has DC'd it due to skin irritation with peeling skin and redness.    Review of Systems   Constitutional:  Negative for chills and fever.   HENT:  Negative for sore throat.    Gastrointestinal:  Negative for abdominal pain, constipation, diarrhea, nausea and vomiting.   Genitourinary:  Negative for dysuria, flank pain, frequency, hematuria, pelvic pain, urgency and vaginal discharge.   Musculoskeletal:  Positive for back pain.   Skin:  Negative for rash.   Neurological:  Negative for headaches.     Irritation from estrogen patch.  Menopausal symptoms.    Physical Exam  Constitutional:       Appearance: Normal appearance. She is obese.   Genitourinary:      Genitourinary Comments: External genitalia unremarkable  Vagina clear cuff intact  Cervix and uterus surgically absent  Adnexa masses or tenderness  Perineum shows small probable condyloma perianal    Breast without masses tenderness or nipple discharge, normal appearance   Breasts:     Breasts are soft.     Right: Normal.      Left: Normal.   HENT:      Head: Normocephalic.      Nose: Nose normal.   Eyes:      Pupils: Pupils are equal, round, and reactive to light.   Cardiovascular:      Rate and Rhythm: Normal rate and regular rhythm.   Pulmonary:      Effort: Pulmonary effort is normal.      Breath sounds: Normal breath sounds.   Abdominal:      General: Abdomen is flat. Bowel sounds are normal.      Palpations: Abdomen is soft.   Musculoskeletal:         General: Normal range of motion.      Cervical back: Normal range of motion and neck supple.   Neurological:      General: No focal deficit present.      Mental Status: She is alert.   Skin:     General: Skin is warm and dry.  "  Psychiatric:         Mood and Affect: Mood normal.         Behavior: Behavior normal.         Thought Content: Thought content normal.         Judgment: Judgment normal.   Vitals reviewed.         Objective   BP (!) 149/97   Ht 1.549 m (5' 1\")   Wt 74.4 kg (164 lb)   BMI 30.99 kg/m²   OB History          5    Para        Term                AB   2    Living   3         SAB   2    IAB        Ectopic        Multiple        Live Births                      GynHx:  Menarche/Menopause: 12/hysterectomy    Social History     Substance and Sexual Activity   Sexual Activity Yes    Partners: Male    Birth control/protection: Post-menopausal     STIs: none    Substance:   Tobacco Use: Medium Risk (2025)    Patient History     Smoking Tobacco Use: Former     Smokeless Tobacco Use: Never     Passive Exposure: Never      Social History     Substance and Sexual Activity   Drug Use Not Currently      Social History     Substance and Sexual Activity   Alcohol Use Yes    Comment: 3-4 a week     Abuse: No  Depression Screen:       Past med hx and past surg hx reviewed and notable for: Prior hysterectomy.  History of HPV/JENNIFER-1    Assessment/Plan      Clinically unremarkable annual GYN exam except for small probable condyloma perianal.  Patient currently is sexually active with steady partner declining STD testing.  Was placed on estrogen patch due to severe menopausal symptoms.  Has had issues with inflammation and peeling skin to the patch.  Will try an oral preparation.  Risks and benefits reviewed.  Patient had Pap done approximately 5 months ago which showed HPV and JENNIFER-1.  Patient did not have colposcopy done.  Discussed with patient.  Will not have colposcopy.  Will have repeati Pap in 3-6 months.  Mammogram scheduled  Return to office in 6 months  "

## 2025-01-16 DIAGNOSIS — N95.1 MENOPAUSAL SYNDROME ON HORMONE REPLACEMENT THERAPY: Primary | ICD-10-CM

## 2025-01-16 DIAGNOSIS — Z79.890 MENOPAUSAL SYNDROME ON HORMONE REPLACEMENT THERAPY: Primary | ICD-10-CM

## 2025-01-16 RX ORDER — ESTRADIOL 0.1 MG/D
1 FILM, EXTENDED RELEASE TRANSDERMAL 2 TIMES WEEKLY
Qty: 8 PATCH | Refills: 11 | Status: SHIPPED | OUTPATIENT
Start: 2025-01-16 | End: 2026-01-16

## 2025-01-16 NOTE — PROGRESS NOTES
Discussed risk/benefits of the patch versus pill of estradiol; she was having CD from the weekly estradiol patch and would like to try the patch again, I changed her prescription to twice weekly but we also discussed other transdermal options

## 2025-03-24 DIAGNOSIS — I10 ESSENTIAL HYPERTENSION: ICD-10-CM

## 2025-03-25 RX ORDER — HYDROCHLOROTHIAZIDE 25 MG/1
25 TABLET ORAL DAILY
Qty: 90 TABLET | Refills: 1 | OUTPATIENT
Start: 2025-03-25

## 2025-04-30 ENCOUNTER — APPOINTMENT (OUTPATIENT)
Dept: OBSTETRICS AND GYNECOLOGY | Facility: CLINIC | Age: 60
End: 2025-04-30
Payer: COMMERCIAL

## 2025-06-11 ENCOUNTER — TELEPHONE (OUTPATIENT)
Dept: OBSTETRICS AND GYNECOLOGY | Facility: CLINIC | Age: 60
End: 2025-06-11
Payer: COMMERCIAL

## 2025-06-12 ENCOUNTER — TELEPHONE (OUTPATIENT)
Dept: OBSTETRICS AND GYNECOLOGY | Facility: CLINIC | Age: 60
End: 2025-06-12
Payer: COMMERCIAL

## 2025-06-12 NOTE — TELEPHONE ENCOUNTER
Pt verified by name and .  Pt has upcoming appt for vulvar biopsy.  Nuse answered pt's questions.  Pt has not questions at this time.

## 2025-06-13 ASSESSMENT — PROMIS GLOBAL HEALTH SCALE
RATE_GENERAL_HEALTH: FAIR
RATE_AVERAGE_PAIN: 7
EMOTIONAL_PROBLEMS: SOMETIMES
RATE_PHYSICAL_HEALTH: FAIR
RATE_QUALITY_OF_LIFE: GOOD
CARRYOUT_PHYSICAL_ACTIVITIES: MODERATELY
RATE_MENTAL_HEALTH: GOOD
CARRYOUT_SOCIAL_ACTIVITIES: GOOD
RATE_AVERAGE_FATIGUE: MODERATE
RATE_SOCIAL_SATISFACTION: GOOD

## 2025-06-16 ENCOUNTER — APPOINTMENT (OUTPATIENT)
Dept: OTOLARYNGOLOGY | Facility: CLINIC | Age: 60
End: 2025-06-16
Payer: COMMERCIAL

## 2025-06-16 VITALS — TEMPERATURE: 97.3 F | WEIGHT: 154 LBS | HEIGHT: 61 IN | BODY MASS INDEX: 29.07 KG/M2

## 2025-06-16 DIAGNOSIS — R60.0 SUBMANDIBULAR GLAND SWELLING: ICD-10-CM

## 2025-06-16 DIAGNOSIS — M54.2 NECK DISCOMFORT: Primary | ICD-10-CM

## 2025-06-16 PROCEDURE — 3008F BODY MASS INDEX DOCD: CPT

## 2025-06-16 PROCEDURE — 1036F TOBACCO NON-USER: CPT

## 2025-06-16 PROCEDURE — 99214 OFFICE O/P EST MOD 30 MIN: CPT

## 2025-06-16 RX ORDER — MAGNESIUM 200 MG
TABLET ORAL
COMMUNITY
Start: 2025-05-27

## 2025-06-16 RX ORDER — CHOLECALCIFEROL (VITAMIN D3) 25 MCG
25 TABLET ORAL DAILY
COMMUNITY

## 2025-06-16 RX ORDER — UBIDECARENONE 30 MG
30 CAPSULE ORAL DAILY
COMMUNITY

## 2025-06-16 ASSESSMENT — PAIN SCALES - GENERAL: PAINLEVEL_OUTOF10: 4

## 2025-06-17 RX ORDER — CEPHALEXIN 500 MG/1
500 CAPSULE ORAL 4 TIMES DAILY
Qty: 40 CAPSULE | Refills: 0 | Status: SHIPPED | OUTPATIENT
Start: 2025-06-17 | End: 2025-06-27

## 2025-06-17 NOTE — PROGRESS NOTES
Chief Complaint  Chief Complaint   Patient presents with    Follow-up     Lower jaw area; possible swollen lymph node, discomfort under tongue.        Pertinent History:  Last seen 10/1/24: Prema Castañeda, who is a 58 y.o. female presenting for an initial visit for swelling in left submandibular region and swelling under left sublingual region..  The patient reports that she had surgery in January of 2023 for drainage of salivary gland and parapharyngeal abscess. The patient was switched from clindamycin to Keflex at previous surgery date. The patient tolerated Keflex without any reactions or side effects. The patient reports that the swelling has been occurring over the past couple weeks. She has been performing light massage to the area and reports feelings of tightness in the submandibular region.  The patient denies any shortness of breath. The patient denies any issues with swallowing solids, liquids, or pills. The patient denies any voice changes. No current smoking hx.   Interval History  Since last visit patient reports that she has been feeling discomfort along the jaw bilaterally, but is greater on the left. Patient reports some swelling and feeling of fullness in the left submandibular region. Patient had previous sialothis removed in 2023. Patient treated with Keflex and steroid in October 2024 and symptoms resolved until roughly a month ago. Patient reports voice is normal. Breathing is stable. Reports tolerating solids, liquids, and pills when swallowing.     Exam:  VOICE: normal   RESPIRATION: Breathing comfortably, no stridor.    ORAL CAVITY/OROPHARYNX/LIPS: Normal mucous membranes, normal floor of mouth/tongue/OP, no masses or lesions are noted.    SKIN: Neck skin is without scar or injury.    PSYCH: Alert and oriented with appropriate mood and affect.       Assessment and Plan:  This is a follow up visit for swelling and discomfort of the left submandibular region.Patient reports discomfort  bilaterally along the jaw. No stones of salivary gland felt with palpation on the floor of mouth. No abscess present. Discussed options of antibiotic and imaging with Dr. Horvath. Will place order for CT soft tissue of neck with IV contrast. Patient previously tolerated Cephalexin. Patient previous issue when taking clindamycin. Will call with results of testing.

## 2025-06-18 ENCOUNTER — APPOINTMENT (OUTPATIENT)
Dept: PRIMARY CARE | Facility: CLINIC | Age: 60
End: 2025-06-18
Payer: COMMERCIAL

## 2025-06-18 VITALS
OXYGEN SATURATION: 98 % | RESPIRATION RATE: 16 BRPM | BODY MASS INDEX: 29.25 KG/M2 | HEART RATE: 87 BPM | WEIGHT: 154.8 LBS | DIASTOLIC BLOOD PRESSURE: 92 MMHG | SYSTOLIC BLOOD PRESSURE: 142 MMHG

## 2025-06-18 DIAGNOSIS — M35.9 UNDIFFERENTIATED CONNECTIVE TISSUE DISEASE (MULTI): ICD-10-CM

## 2025-06-18 DIAGNOSIS — I10 ESSENTIAL HYPERTENSION: Primary | ICD-10-CM

## 2025-06-18 DIAGNOSIS — E78.49 OTHER HYPERLIPIDEMIA: ICD-10-CM

## 2025-06-18 DIAGNOSIS — D89.89 OTHER SPECIFIED DISORDERS INVOLVING THE IMMUNE MECHANISM, NOT ELSEWHERE CLASSIFIED: ICD-10-CM

## 2025-06-18 PROCEDURE — 3077F SYST BP >= 140 MM HG: CPT | Performed by: FAMILY MEDICINE

## 2025-06-18 PROCEDURE — 99214 OFFICE O/P EST MOD 30 MIN: CPT | Performed by: FAMILY MEDICINE

## 2025-06-18 PROCEDURE — 3080F DIAST BP >= 90 MM HG: CPT | Performed by: FAMILY MEDICINE

## 2025-06-18 RX ORDER — CYCLOBENZAPRINE HCL 10 MG
10 TABLET ORAL NIGHTLY PRN
Qty: 30 TABLET | Refills: 3 | Status: SHIPPED | OUTPATIENT
Start: 2025-06-18 | End: 2025-10-16

## 2025-06-18 NOTE — PROGRESS NOTES
Pt is here for issues with previous surg      Chief Complaint:  Follow-up (Has been having a lot of joint pain )  History Of Present Illness:   Prema Castañeda is a 59 y.o. female      Prema Castañeda is a 58 y.o. female presenting with a history of HTN, hyperlipidemia and connective tissue disorder and internal hemorrhoids, Jan 2023- surgery for submandibular/parapharyngeal abscesses, who is presenting for a sick visit      Pain in face.   - saw ENT 6/17/25, just picked up antibiotics today.        Lips have been dry and she's been peeling them. They are dark and purple.     Aches and pains. Agreed to refill muscle relaxer.       Healthcare team:  - ENT  - dental  - GYN  - rheum- re-referral done 6/18/25       Salivary glands and face swelling- Jan 2023.   Jan 2023- surgeries for left submandibular and parapharyngeal abscess. sent home with PICC line for antibiotics.   Feb 15, 2023 update: Was having some symptoms with antibiotics and just went back on antibiotics last week. Muscles hurting, potassium levels were off.   March 8, 2023- will have salivary gland stone removal surgery planned.   Nov 2023 update- some taste changes with spicy foods, used numbing mouth wash that is helpful.   May 2024 update: checked with ENT in Dec 2023, also saw Dentist. She still feels on the left side of her chin area, she feels pain and it feels like stiffness and she still feels something in her left cheek.   June 2025 update: saw ENT yesterday- just picked up antibiotics today.           HTN- she's been taking her hctz. Pt has declined adding medications previously. Her blood pressure has been elevated, and she did not take her medications today. Nov 2023 update: agreed to continue on amlodipine 2.5mg. May 2024- elevated. Agreed to inc amlodipine to 5mg.        Cholesterol- she's not taking her medication. Some more body aches. Feb 2022 update: not taking crestor now. she is taking zetia. Feb 24, 2022- restarted crestor. Nov  2023 update: off and on with her taking crestor. We agreed she could at least try it every other day. May 2024- refilling crestor. June 2025- rechecking lipid, still taking crestor.         Rheum-she will not have a rheumatology visit until January 2021. She states that she is having shoulder pain, which was not improved after a January 2020 subacromial bursa injection here in the office. She is also having vague right elbow pain and other aches and pains that may be related to her mixed connective tissue disorder. May 13, 2021 update: was having pain recently and she went to [facility] for rehab. put back on hydroxychloroquine. Feb 2022 update- back on hydroxychloroquine. March 2022- last visit, still on hydroxychloroquine. Feb 2023 update: still taking hydroxychloroquine. May 2024 update: not on hydroxychloroquine, wondering about most recent test results. June 2025 update: re-referral to rheumatology.        SH: rare cig smoking. stopped etoh. no idu.      Flu shot: declines flu shot     Mammogram: GYN orders mammograms     COVID: never tested positive.       Previous visits:    Feb 22, 2022- Right sided chest pain- moved to her right shoulder blade. started around 315pm and lasted all throughout the night. Used heating pad and nebulizer. Sleeping upright was more helpful. Now it is a dull pain. Not coughing as much today. Exertion does not effect chest pain.   Some chronic left jaw numbness.   Sept 2022 update: none recently.        Feb 16, 2022- Chest congestion and cough. Since last Wed. Wanted to make sure lungs and heart were ok. Granddaughter came home from school Tue with a cough. Then the next day she started getting sick. Cough: all throughout the day, not horrible. Coughing up phlegm and mucus. Some in chest. Was taking nyquil and vitamins. No concerns for COVID.   Sept 2022 update: good now.   Nov 2023 update: had a cold about a month ago, but she's been coughing since then. Sometimes productive,  sometimes dry. We discussed saline rinses.        PSH: left cheek surgery Jan 2023.               SH: house, daughter and 2 grandchildren.        Tobacco: states she was never a smoker, but admits she was around tobacco smoke.       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      Flu shot:  - declines     Immunizations:   - utd per pt.     Colonoscopy: 2016.                       Review of Systems:     Negative  Constitutional:   - fever   - chills   - night sweats  - unexpected weight change  - changes in sleep     Eyes:   - loss of vision  - double vision  - floaters          Cardiovascular:   - chest pain  - chest heaviness  - palpitations  - swelling in ankles       Respiratory:   - shortness of breath  - difficulty breathing  - frequent cough  - wheezing     Neurological:   - loss of consciousness  - tremors  - dizzy spells  - numbness   - tingling     Gastrointestinal:   - abdominal pain  - nausea  - vomiting  - constipation  - diarrhea  - bloody stools  - loss of bowel control  - indigestion  - heartburn  - sour taste in throat when waking up     Genitourinary:   - urinary incontinence  - increased urinary frequency  - painful urination  - blood in urine     Skin:   - Rash  - lumps or bumps  - worrisome moles     Endocrine:   - excessive thirst  - feeling too hot  - feeling too cold  - feeling tired  - fatigue      Hematologic/Lymphatic:   - swollen glands  - blood clotting problems  - easy bruising.     Psychological:   - feelings of depression  - feelings of anxiety.     Sexual:   - sexual health concerns        Positive  Psychological:   - feeling generally happy  - feeling safe at home            Last Recorded Vitals:  Vitals:    06/18/25 1123   BP: (!) 142/92   BP Location: Right arm   Patient Position: Sitting   BP Cuff Size: Adult   Pulse: 87   Resp: 16   SpO2: 98%   Weight: 70.2 kg (154 lb 12.8 oz)          Past Medical History:  She has a past medical history of Anesthesia of skin,  Anxiety disorder, unspecified, Localized enlarged lymph nodes (01/17/2019), Myalgia, unspecified site, Other conditions influencing health status (03/31/2015), Personal history of other diseases of the circulatory system, Personal history of other mental and behavioral disorders, Personal history of other specified conditions, and Pure hypercholesterolemia, unspecified.     Past Surgical History:  She has a past surgical history that includes Tubal ligation (01/10/2014) and Hysterectomy (01/10/2014).     Social History:  She reports that she has quit smoking. Her smoking use included cigarettes. She has never been exposed to tobacco smoke. She has never used smokeless tobacco. She reports current alcohol use. She reports that she does not currently use drugs.     Family History:  No family history on file.  Allergies:  Sulfa (sulfonamide antibiotics), Bactrim [sulfamethoxazole-trimethoprim], Latex, and Penicillins     Outpatient Medications:  Current Outpatient Medications   Medication Instructions    albuterol 90 mcg/actuation inhaler 2 puffs, inhalation, Every 4 hours PRN    amLODIPine (NORVASC) 10 mg, oral, Daily    ascorbic acid (VITAMIN C) 100 mg, Daily    b complex 0.4 mg tablet 1 tablet, Daily    cephalexin (KEFLEX) 500 mg, oral, 4 times daily, Patient has tolerated medication in the past.    cholecalciferol (VITAMIN D3) 25 mcg, Daily    co-enzyme Q-10 30 mg, Daily    cyclobenzaprine (FLEXERIL) 10 mg, oral, Nightly PRN    estradiol (Vivelle-DOT) 0.1 mg/24 hr patch 1 patch, transdermal, 2 times weekly    hydroCHLOROthiazide (HYDRODIURIL) 25 mg, oral, Daily, as directed    magnesium 200 mg tablet     omega-3 fatty acids-fish oil (One-Per-Day Omega-3) 684-1,200 mg capsule Take by mouth.    rosuvastatin (Crestor) 5 mg tablet TAKE 1 TABLET BY MOUTH EVERY DAY        Physical Exam:  GENERAL: Well developed, well nourished, alert and cooperative, and appears to be in no acute distress.     PSYCH: mood pleasant and  appropriate     HEAD: normocephalic     EYES: PERRL, EOMI. vision is grossly intact.          THROAT:    Oropharynx clear.            CARDIAC:   RRR.   No murmur.    LUNGS: Good respiratory effort.  Clear to auscultation bilaterally  No rales  No rhonchi  No wheezing     ABD: soft  Nontender      GAIT: Normal        EXTREMITIES: All 4 extremities are warm and well perfused.   Peripheral pulses intact.    No cyanosis.  No peripheral edema.     NEUROLOGICAL:   CN II-XII grossly intact.              Last Labs:  CBC -  Lab Results   Component Value Date    WBC 13.7 (H) 09/27/2024    HGB 12.2 09/27/2024    HCT 35.9 (L) 09/27/2024    MCV 95 09/27/2024     09/27/2024        CMP -  Lab Results   Component Value Date    CALCIUM 10.1 09/27/2024    PHOS 3.5 01/17/2023    PROT 7.5 09/27/2024    ALBUMIN 3.8 09/27/2024    AST 24 09/27/2024    ALT 28 09/27/2024    ALKPHOS 52 09/27/2024    BILITOT 0.5 09/27/2024        LIPID PANEL -  Lab Results   Component Value Date    CHOL 203 (H) 09/27/2024    TRIG 232 (H) 09/27/2024    HDL 37.1 09/27/2024    CHHDL 5.5 09/27/2024    LDLF 161 (H) 02/15/2023    VLDL 46 (H) 09/27/2024    NHDL 166 (H) 09/27/2024           Lab Results   Component Value Date    BNP 12 11/22/2023    HGBA1C 5.4 10/14/2020       1. Essential hypertension        2. Undifferentiated connective tissue disease (Multi)        3. Other hyperlipidemia               Make rheumatology referral.    Fasting lab work was ordered today. It is likely that your insurance will cover the testing, but if you have any concerns- please check with your insurance company before getting the blood work drawn. I will call you when I get the results.   Please do not eat for 12 hours before getting your blood work drawn (water is ok).       Follow up 3-4 months.         Carmelo Lopez, DO

## 2025-06-19 LAB
ALBUMIN SERPL-MCNC: 3.9 G/DL (ref 3.6–5.1)
ALP SERPL-CCNC: 37 U/L (ref 37–153)
ALT SERPL-CCNC: 16 U/L (ref 6–29)
ANION GAP SERPL CALCULATED.4IONS-SCNC: 8 MMOL/L (CALC) (ref 7–17)
AST SERPL-CCNC: 18 U/L (ref 10–35)
BILIRUB SERPL-MCNC: 0.3 MG/DL (ref 0.2–1.2)
BUN SERPL-MCNC: 6 MG/DL (ref 7–25)
CALCIUM SERPL-MCNC: 9.9 MG/DL (ref 8.6–10.4)
CHLORIDE SERPL-SCNC: 104 MMOL/L (ref 98–110)
CHOLEST SERPL-MCNC: 171 MG/DL
CHOLEST/HDLC SERPL: 3.5 (CALC)
CO2 SERPL-SCNC: 28 MMOL/L (ref 20–32)
CREAT SERPL-MCNC: 0.75 MG/DL (ref 0.5–1.03)
EGFRCR SERPLBLD CKD-EPI 2021: 92 ML/MIN/1.73M2
GLUCOSE SERPL-MCNC: 81 MG/DL (ref 65–99)
HDLC SERPL-MCNC: 49 MG/DL
LDLC SERPL CALC-MCNC: 102 MG/DL (CALC)
NONHDLC SERPL-MCNC: 122 MG/DL (CALC)
POTASSIUM SERPL-SCNC: 3.5 MMOL/L (ref 3.5–5.3)
PROT SERPL-MCNC: 7 G/DL (ref 6.1–8.1)
SODIUM SERPL-SCNC: 140 MMOL/L (ref 135–146)
TRIGL SERPL-MCNC: 108 MG/DL

## 2025-06-20 ENCOUNTER — TELEPHONE (OUTPATIENT)
Dept: PRIMARY CARE | Facility: CLINIC | Age: 60
End: 2025-06-20
Payer: COMMERCIAL

## 2025-06-24 ENCOUNTER — APPOINTMENT (OUTPATIENT)
Dept: OBSTETRICS AND GYNECOLOGY | Facility: CLINIC | Age: 60
End: 2025-06-24
Payer: COMMERCIAL

## 2025-06-24 ENCOUNTER — TELEPHONE (OUTPATIENT)
Dept: PRIMARY CARE | Facility: CLINIC | Age: 60
End: 2025-06-24

## 2025-06-24 DIAGNOSIS — I10 ESSENTIAL HYPERTENSION: Primary | ICD-10-CM

## 2025-06-24 DIAGNOSIS — N90.89 VULVAR LESION: ICD-10-CM

## 2025-06-24 DIAGNOSIS — N76.2 ACUTE VULVITIS: Primary | ICD-10-CM

## 2025-06-24 PROCEDURE — 99214 OFFICE O/P EST MOD 30 MIN: CPT | Performed by: OBSTETRICS & GYNECOLOGY

## 2025-06-24 PROCEDURE — 87529 HSV DNA AMP PROBE: CPT

## 2025-06-24 NOTE — PATIENT INSTRUCTIONS
Thanks for coming in today for follow-up.    Cultures and blood work/labs are being sent to help evaluate your vulvar irritation and vaginal discharge.  Results should be available in the next 24 to 48 hours.  You may call the office and select option #2 to speak with the nurse to obtain the results.    Return to the office in the next several weeks for vulvar biopsy.    Follow-up with your PCP and other healthcare specialist as needed.    Feel free to call the office with any problems, questions or concerns prior to your next scheduled visit.

## 2025-06-24 NOTE — PROGRESS NOTES
Assessment and Plan:  Prema Castañeda is a 58 y/o  postmenopausal woman presenting today for follow up.    Diagnoses:  #1 Acute vulvitis  #2 Vulvar lesion    Assessment/Plan:  1. Acute vulvovaginitis, likely related to current antibiotic use  - Continue with her prescription, and she has a Diflucan she may use.  - Sample of Good Clean Love anti-itch lidocaine gel was provided. The patient may use for discomfort.  - BV, GC/CT swabs sent.  - HSV IgG and HSV culture sent. Patient to call for results.    2. Vulvar lesion, possible condyloma  - Return to the office after her vulvovaginitis has resolved for vulvar biopsy.  - Discussed with patient and her partner risks for the procedure.    Follow up with Dr. Stovall.    Radha Attestation  By signing my name below, I, Radha Davis, attest that this documentation has been prepared under the direction and in the presence of Hellen Stovall MD on 2025 at 3:27 PM.     HPI:   Prema Castañeda is a 58 y/o  postmenopausal woman presenting today for follow up. She was seen by me in 2024, and I noted some vulvar lesions, one possible sebaceous cyst, and some other lesions near the posterior fourchette. 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 She underwent colposcopy. I am not sure if biopsies were performed.    She returns today for a vulvar biopsy. However, she was recently started on antibiotics.    ROS:  Review of Systems   Genitourinary:         Positive for vulvar lesion.  Positive for vulvovaginal irritation.     Physical Exam:   Physical Exam  Constitutional:       General: She is not in acute distress.     Appearance: Normal appearance. She is normal weight.   Genitourinary:      Vulva exam comments: Normal appearing external female genitalia, normal Bartholin's and Bendon's glands bilaterally.   There is a clear white, non-malodorous vaginal discharge on  the vulva. Near the clitoral charlton, there is a very superficial 1 cm tender, abraded area/ulcer. Near the posterior fourchette, there is a raised 3 mm in length by 0.5 mm in width, linear grey raised lesion, possible condylomatous. There is a second smaller lesion in the perineal area and a less than 1 mm shallow, similar ulcerative type lesion in the posterior fourchette..   Neurological:      Mental Status: She is alert and oriented to person, place, and time.      Comments: Pleasant and cooperative

## 2025-06-24 NOTE — TELEPHONE ENCOUNTER
Pt was concerned about low normal bicarb level. Encouraged that it was largely normal. But pt requesting nephrology referral

## 2025-06-25 LAB
BV SCORE VAG QL: NORMAL
C TRACH RRNA SPEC QL NAA+PROBE: NOT DETECTED
HSV1 DNA SKIN QL NAA+PROBE: NOT DETECTED
HSV1 IGG SER IA-ACNC: 20.2 INDEX
HSV2 DNA SKIN QL NAA+PROBE: NOT DETECTED
HSV2 IGG SER IA-ACNC: 9.21 INDEX
N GONORRHOEA RRNA SPEC QL NAA+PROBE: NOT DETECTED
QUEST GC CT AMPLIFIED (ALWAYS MESSAGE): NORMAL

## 2025-06-27 ENCOUNTER — HOSPITAL ENCOUNTER (OUTPATIENT)
Dept: RADIOLOGY | Facility: CLINIC | Age: 60
Discharge: HOME | End: 2025-06-27
Payer: COMMERCIAL

## 2025-06-27 VITALS — HEIGHT: 61 IN | WEIGHT: 152 LBS | BODY MASS INDEX: 28.7 KG/M2

## 2025-06-27 DIAGNOSIS — R60.0 SUBMANDIBULAR GLAND SWELLING: ICD-10-CM

## 2025-06-27 DIAGNOSIS — Z12.31 SCREENING MAMMOGRAM FOR BREAST CANCER: ICD-10-CM

## 2025-06-27 PROCEDURE — 70491 CT SOFT TISSUE NECK W/DYE: CPT

## 2025-06-27 PROCEDURE — 77067 SCR MAMMO BI INCL CAD: CPT

## 2025-06-27 PROCEDURE — 77067 SCR MAMMO BI INCL CAD: CPT | Performed by: RADIOLOGY

## 2025-06-27 PROCEDURE — 2550000001 HC RX 255 CONTRASTS: Mod: JZ

## 2025-06-27 PROCEDURE — 77063 BREAST TOMOSYNTHESIS BI: CPT | Performed by: RADIOLOGY

## 2025-06-27 RX ADMIN — IOHEXOL 75 ML: 350 INJECTION, SOLUTION INTRAVENOUS at 09:29

## 2025-07-02 ENCOUNTER — APPOINTMENT (OUTPATIENT)
Dept: PRIMARY CARE | Facility: CLINIC | Age: 60
End: 2025-07-02
Payer: COMMERCIAL

## 2025-07-11 DIAGNOSIS — I10 ESSENTIAL HYPERTENSION: ICD-10-CM

## 2025-07-11 RX ORDER — HYDROCHLOROTHIAZIDE 25 MG/1
25 TABLET ORAL DAILY
Qty: 90 TABLET | Refills: 0 | Status: SHIPPED | OUTPATIENT
Start: 2025-07-11

## 2025-07-15 ENCOUNTER — APPOINTMENT (OUTPATIENT)
Dept: OBSTETRICS AND GYNECOLOGY | Facility: CLINIC | Age: 60
End: 2025-07-15
Payer: COMMERCIAL

## 2025-07-22 ENCOUNTER — APPOINTMENT (OUTPATIENT)
Dept: OBSTETRICS AND GYNECOLOGY | Facility: CLINIC | Age: 60
End: 2025-07-22
Payer: COMMERCIAL

## 2025-07-29 ENCOUNTER — APPOINTMENT (OUTPATIENT)
Dept: OBSTETRICS AND GYNECOLOGY | Facility: CLINIC | Age: 60
End: 2025-07-29
Payer: COMMERCIAL

## 2025-08-12 ENCOUNTER — TELEPHONE (OUTPATIENT)
Dept: OBSTETRICS AND GYNECOLOGY | Facility: CLINIC | Age: 60
End: 2025-08-12

## 2025-08-12 ENCOUNTER — APPOINTMENT (OUTPATIENT)
Dept: OBSTETRICS AND GYNECOLOGY | Facility: CLINIC | Age: 60
End: 2025-08-12
Payer: MEDICAID

## 2025-08-12 VITALS
HEIGHT: 61 IN | WEIGHT: 154 LBS | SYSTOLIC BLOOD PRESSURE: 118 MMHG | BODY MASS INDEX: 29.07 KG/M2 | DIASTOLIC BLOOD PRESSURE: 80 MMHG

## 2025-08-12 DIAGNOSIS — A63.0: Primary | ICD-10-CM

## 2025-08-12 PROCEDURE — 3079F DIAST BP 80-89 MM HG: CPT | Performed by: STUDENT IN AN ORGANIZED HEALTH CARE EDUCATION/TRAINING PROGRAM

## 2025-08-12 PROCEDURE — 3008F BODY MASS INDEX DOCD: CPT | Performed by: STUDENT IN AN ORGANIZED HEALTH CARE EDUCATION/TRAINING PROGRAM

## 2025-08-12 PROCEDURE — 3074F SYST BP LT 130 MM HG: CPT | Performed by: STUDENT IN AN ORGANIZED HEALTH CARE EDUCATION/TRAINING PROGRAM

## 2025-08-12 PROCEDURE — 99215 OFFICE O/P EST HI 40 MIN: CPT | Performed by: STUDENT IN AN ORGANIZED HEALTH CARE EDUCATION/TRAINING PROGRAM

## 2025-08-12 RX ORDER — IMIQUIMOD 12.5 MG/.25G
1 CREAM TOPICAL NIGHTLY
Qty: 30 PACKET | Refills: 1 | Status: SHIPPED | OUTPATIENT
Start: 2025-08-12 | End: 2025-10-11

## 2025-08-13 LAB
CREAT SERPL-MCNC: 0.75 MG/DL (ref 0.5–1.03)
EGFRCR SERPLBLD CKD-EPI 2021: 92 ML/MIN/1.73M2

## 2025-09-04 ENCOUNTER — APPOINTMENT (OUTPATIENT)
Dept: RHEUMATOLOGY | Facility: CLINIC | Age: 60
End: 2025-09-04
Payer: COMMERCIAL

## 2025-09-18 ENCOUNTER — APPOINTMENT (OUTPATIENT)
Dept: PRIMARY CARE | Facility: CLINIC | Age: 60
End: 2025-09-18
Payer: COMMERCIAL

## 2025-11-10 ENCOUNTER — APPOINTMENT (OUTPATIENT)
Dept: NEPHROLOGY | Facility: CLINIC | Age: 60
End: 2025-11-10
Payer: COMMERCIAL

## 2025-11-12 ENCOUNTER — APPOINTMENT (OUTPATIENT)
Dept: OBSTETRICS AND GYNECOLOGY | Facility: CLINIC | Age: 60
End: 2025-11-12
Payer: COMMERCIAL

## 2025-11-18 ENCOUNTER — APPOINTMENT (OUTPATIENT)
Dept: OBSTETRICS AND GYNECOLOGY | Facility: CLINIC | Age: 60
End: 2025-11-18
Payer: COMMERCIAL